# Patient Record
Sex: FEMALE | Race: WHITE | NOT HISPANIC OR LATINO | Employment: FULL TIME | URBAN - METROPOLITAN AREA
[De-identification: names, ages, dates, MRNs, and addresses within clinical notes are randomized per-mention and may not be internally consistent; named-entity substitution may affect disease eponyms.]

---

## 2017-01-06 ENCOUNTER — OFFICE VISIT (OUTPATIENT)
Dept: OBGYN | Facility: CLINIC | Age: 32
End: 2017-01-06
Payer: COMMERCIAL

## 2017-01-06 VITALS
DIASTOLIC BLOOD PRESSURE: 64 MMHG | BODY MASS INDEX: 26.96 KG/M2 | WEIGHT: 182 LBS | SYSTOLIC BLOOD PRESSURE: 118 MMHG | HEIGHT: 69 IN

## 2017-01-06 DIAGNOSIS — Z01.419 ENCOUNTER FOR GYNECOLOGICAL EXAMINATION WITHOUT ABNORMAL FINDING: Primary | ICD-10-CM

## 2017-01-06 DIAGNOSIS — N94.6 DYSMENORRHEA: ICD-10-CM

## 2017-01-06 DIAGNOSIS — Z78.9 USES CONTRACEPTION: ICD-10-CM

## 2017-01-06 DIAGNOSIS — Z13.6 ENCOUNTER FOR LIPID SCREENING FOR CARDIOVASCULAR DISEASE: ICD-10-CM

## 2017-01-06 DIAGNOSIS — Z13.228 SCREENING FOR METABOLIC DISORDER: ICD-10-CM

## 2017-01-06 DIAGNOSIS — Z13.0 SCREENING FOR DISORDER OF BLOOD AND BLOOD-FORMING ORGANS: ICD-10-CM

## 2017-01-06 DIAGNOSIS — Z13.220 ENCOUNTER FOR LIPID SCREENING FOR CARDIOVASCULAR DISEASE: ICD-10-CM

## 2017-01-06 LAB
ALBUMIN SERPL-MCNC: 3.9 G/DL (ref 3.4–5)
ALP SERPL-CCNC: 61 U/L (ref 40–150)
ALT SERPL W P-5'-P-CCNC: 33 U/L (ref 0–50)
ANION GAP SERPL CALCULATED.3IONS-SCNC: 9 MMOL/L (ref 3–14)
AST SERPL W P-5'-P-CCNC: 14 U/L (ref 0–45)
BILIRUB SERPL-MCNC: 0.4 MG/DL (ref 0.2–1.3)
BUN SERPL-MCNC: 14 MG/DL (ref 7–30)
CALCIUM SERPL-MCNC: 8.9 MG/DL (ref 8.5–10.1)
CHLORIDE SERPL-SCNC: 105 MMOL/L (ref 94–109)
CHOLEST SERPL-MCNC: 219 MG/DL
CO2 SERPL-SCNC: 25 MMOL/L (ref 20–32)
CREAT SERPL-MCNC: 0.73 MG/DL (ref 0.52–1.04)
ERYTHROCYTE [DISTWIDTH] IN BLOOD BY AUTOMATED COUNT: 11.9 % (ref 10–15)
GFR SERPL CREATININE-BSD FRML MDRD: NORMAL ML/MIN/1.7M2
GLUCOSE SERPL-MCNC: 82 MG/DL (ref 70–99)
HCT VFR BLD AUTO: 43.1 % (ref 35–47)
HDLC SERPL-MCNC: 101 MG/DL
HGB BLD-MCNC: 14.9 G/DL (ref 11.7–15.7)
LDLC SERPL CALC-MCNC: 104 MG/DL
MCH RBC QN AUTO: 31.4 PG (ref 26.5–33)
MCHC RBC AUTO-ENTMCNC: 34.6 G/DL (ref 31.5–36.5)
MCV RBC AUTO: 91 FL (ref 78–100)
NONHDLC SERPL-MCNC: 118 MG/DL
PLATELET # BLD AUTO: 225 10E9/L (ref 150–450)
POTASSIUM SERPL-SCNC: 3.7 MMOL/L (ref 3.4–5.3)
PROT SERPL-MCNC: 7 G/DL (ref 6.8–8.8)
RBC # BLD AUTO: 4.75 10E12/L (ref 3.8–5.2)
SODIUM SERPL-SCNC: 139 MMOL/L (ref 133–144)
TRIGL SERPL-MCNC: 72 MG/DL
WBC # BLD AUTO: 7.2 10E9/L (ref 4–11)

## 2017-01-06 PROCEDURE — 80053 COMPREHEN METABOLIC PANEL: CPT | Performed by: OBSTETRICS & GYNECOLOGY

## 2017-01-06 PROCEDURE — 99395 PREV VISIT EST AGE 18-39: CPT | Performed by: OBSTETRICS & GYNECOLOGY

## 2017-01-06 PROCEDURE — 36415 COLL VENOUS BLD VENIPUNCTURE: CPT | Performed by: OBSTETRICS & GYNECOLOGY

## 2017-01-06 PROCEDURE — 85027 COMPLETE CBC AUTOMATED: CPT | Performed by: OBSTETRICS & GYNECOLOGY

## 2017-01-06 PROCEDURE — 80061 LIPID PANEL: CPT | Performed by: OBSTETRICS & GYNECOLOGY

## 2017-01-06 ASSESSMENT — ANXIETY QUESTIONNAIRES
1. FEELING NERVOUS, ANXIOUS, OR ON EDGE: MORE THAN HALF THE DAYS
2. NOT BEING ABLE TO STOP OR CONTROL WORRYING: MORE THAN HALF THE DAYS
6. BECOMING EASILY ANNOYED OR IRRITABLE: MORE THAN HALF THE DAYS
7. FEELING AFRAID AS IF SOMETHING AWFUL MIGHT HAPPEN: MORE THAN HALF THE DAYS
IF YOU CHECKED OFF ANY PROBLEMS ON THIS QUESTIONNAIRE, HOW DIFFICULT HAVE THESE PROBLEMS MADE IT FOR YOU TO DO YOUR WORK, TAKE CARE OF THINGS AT HOME, OR GET ALONG WITH OTHER PEOPLE: VERY DIFFICULT
5. BEING SO RESTLESS THAT IT IS HARD TO SIT STILL: MORE THAN HALF THE DAYS
3. WORRYING TOO MUCH ABOUT DIFFERENT THINGS: MORE THAN HALF THE DAYS
GAD7 TOTAL SCORE: 15

## 2017-01-06 ASSESSMENT — PATIENT HEALTH QUESTIONNAIRE - PHQ9: 5. POOR APPETITE OR OVEREATING: NEARLY EVERY DAY

## 2017-01-06 NOTE — PROGRESS NOTES
Beth is a 31 year old No obstetric history on file. female who presents for annual exam.     Besides routine health maintenance, she has no other health concerns today .    HPI:  The patient's PCP is  Physician No Ref-Primary.    Has always had some anxiety, never dx'd o or treated.   No smoking.   Not exercising lately-has treadmil at home. Plans to try to get back into it this year.  Doing well on OCPs, likes the ortho cyclen. Would like refill. Works well for cramps.         GYNECOLOGIC HISTORY:    Patient's last menstrual period was 12/13/2016.  Her current contraception method is: oral contraceptives.  She  reports that she has never smoked. She has never used smokeless tobacco.    Patient is sexually active.  STD testing offered?  Declined  Last PHQ-9 score on record =   PHQ-9 SCORE 1/6/2017   Total Score 8     Last GAD7 score on record =   MC-7 SCORE 1/6/2017   Total Score 15     Alcohol Score = 4    HEALTH MAINTENANCE:  Cholesterol: none  Last Mammo: na, Result: not applicable, Next Mammo: na   Pap: 2004 ASCUS, 2005 neg, 2007 ASCUS/HPV neg, 2008 neg/neg, 2009 neg, 2012 neg/neg, 2014 neg/neg  Colonoscopy:  na, Result: nal, Next Colonoscopy: na years.  Dexa:  na    Health maintenance updated:  yes    HISTORY:  Obstetric History     No data available          There is no problem list on file for this patient.    Past Surgical History   Procedure Laterality Date     Colposcopu        Social History   Substance Use Topics     Smoking status: Never Smoker      Smokeless tobacco: Never Used     Alcohol Use: 0.0 oz/week     0 Standard drinks or equivalent per week           Current Outpatient Prescriptions   Medication Sig     ORTHO-CYCLEN, 28, 0.25-35 MG-MCG per tablet Take 1 tablet by mouth daily     [DISCONTINUED] ORTHO-CYCLEN, 28, 0.25-35 MG-MCG per tablet TAKE 1 TABLET BY MOUTH EVERY DAY     NO ACTIVE MEDICATIONS       No current facility-administered medications for this visit.     No Known  "Allergies    Past medical, surgical, social and family histories were reviewed and updated in EPIC.    ROS:   12 point review of systems negative other than symptoms noted below.    EXAM:  /64 mmHg  Ht 5' 8.5\" (1.74 m)  Wt 182 lb (82.555 kg)  BMI 27.27 kg/m2  LMP 12/13/2016  Breastfeeding? No   BMI: Body mass index is 27.27 kg/(m^2).    PHYSICAL EXAM:  Constitutional:  Appearance: Well nourished, well developed, alert, in no acute distress  Neck:  Lymph Nodes:  No lymphadenopathy present    Thyroid:  Gland size normal, nontender, no nodules or masses present  on palpation  Chest:  Respiratory Effort:  Breathing unlabored  Cardiovascular:    Heart: Auscultation:  Regular rate, normal rhythm, no murmurs present  Breasts: Inspection of Breasts:  No lymphadenopathy present    Palpation of Breasts and Axillae:  No masses present on palpation, no  breast tenderness    Axillary Lymph Nodes:  No lymphadenopathy present  Gastrointestinal:   Abdominal Examination:  Abdomen nontender to palpation, tone normal without rigidity or guarding, no masses present, umbilicus without lesions   Liver and Spleen:  No hepatomegaly present, liver nontender to palpation    Hernias:  No hernias present  Lymphatic: Lymph Nodes:  No other lymphadenopathy present  Skin:  General Inspection:  No rashes present, no lesions present, no areas of  discoloration    Genitalia and Groin:  No rashes present, no lesions present, no areas of  discoloration, no masses present  Neurologic/Psychiatric:    Mental Status:  Oriented X3     Pelvic Exam:  External Genitalia:     Normal appearance for age, no discharge present, no tenderness present, no inflammatory lesions present, color normal  Vagina:     Normal vaginal vault without central or paravaginal defects, no discharge present, no inflammatory lesions present, no masses present  Bladder:     Nontender to palpation  Urethra:   Urethral Body:  Urethra palpation normal, urethra structural " support normal   Urethral Meatus:  No erythema or lesions present  Cervix:     Appearance healthy, no lesions present, nontender to palpation, no bleeding present  Uterus:     Nontender to palpation, no masses present, position anteflexed, mobility: normal  Adnexa:     No adnexal tenderness present, no adnexal masses present  Perineum:     Perineum within normal limits, no evidence of trauma, no rashes or skin lesions present  Anus:     Anus within normal limits, no hemorrhoids present  Inguinal Lymph Nodes:     No lymphadenopathy present  Pubic Hair:     Normal pubic hair distribution for age  Genitalia and Groin:     No rashes present, no lesions present, no areas of discoloration, no masses present    COUNSELING:   Reviewed preventive health counseling, as reflected in patient instructions       Regular exercise       Healthy diet/nutrition       Contraception    BMI: Body mass index is 27.27 kg/(m^2).  Weight management plan: Discussed healthy diet and exercise guidelines and patient will follow up in 12 months in clinic to re-evaluate.    ASSESSMENT:  31 year old female with satisfactory annual exam.    ICD-10-CM    1. Encounter for gynecological examination without abnormal finding [Z01.419] Z01.419    2. Uses contraception Z30.9 ORTHO-CYCLEN, 28, 0.25-35 MG-MCG per tablet   3. Encounter for lipid screening for cardiovascular disease Z13.220 Lipid Profile (Chol, Trig, HDL, LDL calc)    Z13.6    4. Screening for disorder of blood and blood-forming organs Z13.0 CBC with platelets   5. Screening for metabolic disorder Z13.228 Comprehensive metabolic panel   6. Dysmenorrhea N94.6        PLAN:  -UTD (2014) for cervical cancer screening. Reviewed guidelines.  -Breast self awareness discussed.   -Discussed exercise-making plan, strength training. Nutrition encouraged.  -Fasting labs reviewed  -Refill OCPs, usses for contraception and dysmenorrhea. Happy with name brand, will refill.   Reviwed age related fertility  decline, aneuploidy risk with increasing age as pt wondering when she should get pregnant. Ultimately, needs to have her health and life settled and feel overall ready-should not feel pressured based on age alone.  -Return one year for next annual exam      Gisele Velas, DO

## 2017-01-06 NOTE — MR AVS SNAPSHOT
"              After Visit Summary   1/6/2017    Beth Bradley    MRN: [id number]           Patient Information     Date Of Birth          1985        Visit Information        Provider Department      1/6/2017 8:30 AM Gisele Corea DO [facility]        Today's Diagnoses     Encounter for gynecological examination without abnormal finding [Z01.419]    -  1     Uses contraception         Encounter for lipid screening for cardiovascular disease         Screening for disorder of blood and blood-forming organs         Screening for metabolic disorder         Dysmenorrhea            Follow-ups after your visit        Follow-up notes from your care team     Return in about 1 year (around 1/6/2018).      Who to contact     If you have questions or need follow up information about today's clinic visit or your schedule please contact [facility] directly at [phone].  Normal or non-critical lab and imaging results will be communicated to you by [employer], letter or phone within 4 business days after the clinic has received the results. If you do not hear from us within 7 days, please contact the clinic through [employer] or phone. If you have a critical or abnormal lab result, we will notify you by phone as soon as possible.  Submit refill requests through MiTio or call your pharmacy and they will forward the refill request to us. Please allow 3 business days for your refill to be completed.          Additional Information About Your Visit        MyChart Information     MiTio lets you send messages to your doctor, view your test results, renew your prescriptions, schedule appointments and more. To sign up, go to [email or web address] . Click on \"Log in\" on the left side of the screen, which will take you to the Welcome page. Then click on \"Sign up Now\" on the right side of the page.     You will be asked to enter the access code listed below, as well as some " "personal information. Please follow the directions to create your username and password.     Your access code is: [email or web address]  Expires: 2017 10:45 AM     Your access code will  in 90 days. If you need help or a new code, please call your [place] clinic or [phone].        Care EveryWhere ID     This is your Care EveryWhere ID. This could be used by other organizations to access your [place] medical records  [id number]        Your Vitals Were     Height BMI (Body Mass Index) Last Period Breastfeeding?          5' 8.5\" (1.74 m) 27.27 kg/m2 2016 No         Blood Pressure from Last 3 Encounters:   17 118/64   12 105/68    Weight from Last 3 Encounters:   17 182 lb (82.555 kg)              We Performed the Following     CBC with platelets     Comprehensive metabolic panel     Lipid Profile (Chol, Trig, HDL, LDL calc)          Today's Medication Changes          These changes are accurate as of: 17 10:45 AM.  If you have any questions, ask your nurse or doctor.               These medicines have changed or have updated prescriptions.        Dose/Directions    ORTHO-CYCLEN (28) 0.25-35 MG-MCG per tablet   This may have changed:  See the new instructions.   Used for:  Uses contraception   Generic drug:  norgestimate-ethinyl estradiol   Changed by:  Gisele Corea,         Dose:  1 tablet   Take 1 tablet by mouth daily   Quantity:  84 tablet   Refills:  4         Stop taking these medicines if you haven't already. Please contact your care team if you have questions.     ondansetron 4 MG tablet   Commonly known as:  ZOFRAN   Stopped by:  Gisele Corea, DO                Where to get your medicines      These medications were sent to [facility]pharm[facility][id number] [facility] [address] [facility]  [address] [facility] [address]     Phone:  [phone]    - ORTHO-CYCLEN (28) 0.25-35 MG-MCG per tablet             Primary Care Provider    Physician No " Ref-Primary       No address on file        Thank you!     Thank you for choosing [facility] [place]  for your care. Our goal is always to provide you with excellent care. Hearing back from our patients is one way we can continue to improve our services. Please take a few minutes to complete the written survey that you may receive in the mail after your visit with us. Thank you!             Your Updated Medication List - Protect others around you: Learn how to safely use, store and throw away your medicines at www.disposemymeds.org.          This list is accurate as of: 1/6/17 10:45 AM.  Always use your most recent med list.                   Brand Name Dispense Instructions for use    NO ACTIVE MEDICATIONS          ORTHO-CYCLEN (28) 0.25-35 MG-MCG per tablet   Generic drug:  norgestimate-ethinyl estradiol     84 tablet    Take 1 tablet by mouth daily

## 2017-01-07 ASSESSMENT — ANXIETY QUESTIONNAIRES: GAD7 TOTAL SCORE: 15

## 2017-01-07 ASSESSMENT — PATIENT HEALTH QUESTIONNAIRE - PHQ9: SUM OF ALL RESPONSES TO PHQ QUESTIONS 1-9: 8

## 2017-01-13 NOTE — PROGRESS NOTES
Quick Note:    Cholesterol overall is pretty good, the good cholesterol (HDL) is high. Continue to make changes toward increasing aerobic and wt bearing exercises. Please call pt to notify.     Gisele Corea, DO    ______

## 2017-09-26 ENCOUNTER — TELEPHONE (OUTPATIENT)
Dept: NURSING | Facility: CLINIC | Age: 32
End: 2017-09-26

## 2017-09-26 DIAGNOSIS — F41.9 ANXIETY: Primary | ICD-10-CM

## 2017-09-26 DIAGNOSIS — F32.A DEPRESSION, UNSPECIFIED DEPRESSION TYPE: ICD-10-CM

## 2017-09-26 ASSESSMENT — ANXIETY QUESTIONNAIRES
2. NOT BEING ABLE TO STOP OR CONTROL WORRYING: NEARLY EVERY DAY
7. FEELING AFRAID AS IF SOMETHING AWFUL MIGHT HAPPEN: NEARLY EVERY DAY
6. BECOMING EASILY ANNOYED OR IRRITABLE: MORE THAN HALF THE DAYS
IF YOU CHECKED OFF ANY PROBLEMS ON THIS QUESTIONNAIRE, HOW DIFFICULT HAVE THESE PROBLEMS MADE IT FOR YOU TO DO YOUR WORK, TAKE CARE OF THINGS AT HOME, OR GET ALONG WITH OTHER PEOPLE: VERY DIFFICULT
5. BEING SO RESTLESS THAT IT IS HARD TO SIT STILL: MORE THAN HALF THE DAYS
1. FEELING NERVOUS, ANXIOUS, OR ON EDGE: NEARLY EVERY DAY
3. WORRYING TOO MUCH ABOUT DIFFERENT THINGS: NEARLY EVERY DAY
GAD7 TOTAL SCORE: 19

## 2017-09-26 ASSESSMENT — PATIENT HEALTH QUESTIONNAIRE - PHQ9
5. POOR APPETITE OR OVEREATING: NEARLY EVERY DAY
SUM OF ALL RESPONSES TO PHQ QUESTIONS 1-9: 19

## 2017-09-26 NOTE — TELEPHONE ENCOUNTER
"Pt calling, last time was in clinic for annual (1/6/17) states her and Dr. Corea discussed mental health professionals and pt is wondering if there was any mention of specific providers of who Dr. Corea would refer her to/wondering if she can get a referral as this is now impacting her work and relationships. She doesn't have a primary provider, the provider she sees is Dr. Corea. Informed she can check with her insurance to see who is covered, can contact clinics to see if they take her insurance, may not need referral to be seen but at some places may not. Indicated typically our providers indicate to be seen whoever is first available as sometimes can take awhile to get in with Mental Health.    Asked pt PHQ-9 & MC-7 over the telephone. PHQ-9: result of 19 (Extremely Difficult) and MC-7: result of 19 (Very Difficult). Pt noted on the question \"Thoughts that you would be better off dead, or of hurting yourself in some way\" several days over the last 2 wks. States that has thoughts about death but does NOT have a plan or thoughts to go through with something and harm herself. Reviewed with pt twice while on the phone if she were to have thoughts of harming herself or a plan to go into the nearest ER and call a crisis line, pt verbalized understanding.    Recommended having a PCP, but recommend based off her scores to be seen this wk by Dr. Corea or a PCP. Pt verbalized understanding, scheduled appt with Dr. Corea for Thurs. 9/28/17 at 8:40 AM for long appt, advised if anything changes in between then to let us know. Pt verbalized understanding.      Routing to Dr. Corea to review/advise. Can Triage place a mental health referral so that process can get started?        "

## 2017-09-27 ASSESSMENT — ANXIETY QUESTIONNAIRES: GAD7 TOTAL SCORE: 19

## 2017-09-27 NOTE — TELEPHONE ENCOUNTER
"Attempted to contact the pt. The msg was \"mailbox\" is full.  Will call back later.    Was able to make contact with pt at 0830- Gave the pt the phone number for [facility] intake to call to make an appt. Pt voiced understanding.  "

## 2017-09-27 NOTE — TELEPHONE ENCOUNTER
Yes, referral placed.  I am always ok with triage placing referral without delay.    Gisele De Leon Masters, DO

## 2017-10-09 ENCOUNTER — TELEPHONE (OUTPATIENT)
Dept: OBGYN | Facility: CLINIC | Age: 32
End: 2017-10-09

## 2017-10-09 ENCOUNTER — OFFICE VISIT (OUTPATIENT)
Dept: OBGYN | Facility: CLINIC | Age: 32
End: 2017-10-09
Payer: COMMERCIAL

## 2017-10-09 VITALS
SYSTOLIC BLOOD PRESSURE: 130 MMHG | HEIGHT: 69 IN | BODY MASS INDEX: 25.33 KG/M2 | DIASTOLIC BLOOD PRESSURE: 80 MMHG | WEIGHT: 171 LBS

## 2017-10-09 DIAGNOSIS — N89.8 VAGINAL DISCHARGE: ICD-10-CM

## 2017-10-09 DIAGNOSIS — N76.0 BV (BACTERIAL VAGINOSIS): ICD-10-CM

## 2017-10-09 DIAGNOSIS — F41.9 ANXIETY: ICD-10-CM

## 2017-10-09 DIAGNOSIS — Z11.8 SCREENING FOR CHLAMYDIAL DISEASE: ICD-10-CM

## 2017-10-09 DIAGNOSIS — B96.89 BV (BACTERIAL VAGINOSIS): ICD-10-CM

## 2017-10-09 DIAGNOSIS — Z11.3 SCREEN FOR STD (SEXUALLY TRANSMITTED DISEASE): Primary | ICD-10-CM

## 2017-10-09 LAB
SPECIMEN SOURCE: ABNORMAL
WET PREP SPEC: ABNORMAL

## 2017-10-09 PROCEDURE — 87210 SMEAR WET MOUNT SALINE/INK: CPT | Performed by: OBSTETRICS & GYNECOLOGY

## 2017-10-09 PROCEDURE — 36415 COLL VENOUS BLD VENIPUNCTURE: CPT | Performed by: OBSTETRICS & GYNECOLOGY

## 2017-10-09 PROCEDURE — 87491 CHLMYD TRACH DNA AMP PROBE: CPT | Performed by: OBSTETRICS & GYNECOLOGY

## 2017-10-09 PROCEDURE — 99214 OFFICE O/P EST MOD 30 MIN: CPT | Performed by: OBSTETRICS & GYNECOLOGY

## 2017-10-09 PROCEDURE — 87591 N.GONORRHOEAE DNA AMP PROB: CPT | Performed by: OBSTETRICS & GYNECOLOGY

## 2017-10-09 PROCEDURE — 86780 TREPONEMA PALLIDUM: CPT | Performed by: OBSTETRICS & GYNECOLOGY

## 2017-10-09 PROCEDURE — 87389 HIV-1 AG W/HIV-1&-2 AB AG IA: CPT | Performed by: OBSTETRICS & GYNECOLOGY

## 2017-10-09 NOTE — MR AVS SNAPSHOT
After Visit Summary   10/9/2017    Beth Bradley    MRN: [id number]           Patient Information     Date Of Birth          1985        Visit Information        Provider Department      10/9/2017 11:20 AM Gisele Corea DO [facility]        Today's Diagnoses     Screen for STD (sexually transmitted disease)    -  1    Screening for chlamydial disease        Anxiety        Vaginal discharge        BV (bacterial vaginosis)           Follow-ups after your visit        Additional Services     MENTAL HEALTH REFERRAL       Your provider has referred you to: Behavioral Healthcare Providers Intake Scheduling [phone]  [email or web address]    PSYCHIATRY referral    All scheduling is subject to the client's specific insurance plan & benefits, provider/location availability, and provider clinical specialities.  Please arrive 15 minutes early for your first appointment and bring your completed paperwork.    Please be aware that coverage of these services is subject to the terms and limitations of your health insurance plan.  Call member services at your health plan with any benefit or coverage questions.                  Follow-up notes from your care team     Return if symptoms worsen or fail to improve.      Who to contact     If you have questions or need follow up information about today's clinic visit or your schedule please contact [facility] directly at [phone].  Normal or non-critical lab and imaging results will be communicated to you by MyChart, letter or phone within 4 business days after the clinic has received the results. If you do not hear from us within 7 days, please contact the clinic through MyChart or phone. If you have a critical or abnormal lab result, we will notify you by phone as soon as possible.  Submit refill requests through [employer] or call your pharmacy and they will forward the refill request to us. Please  "allow 3 business days for your refill to be completed.          Additional Information About Your Visit        MyChart Information     [employer] lets you send messages to your doctor, view your test results, renew your prescriptions, schedule appointments and more. To sign up, go to [email or web address] . Click on \"Log in\" on the left side of the screen, which will take you to the Welcome page. Then click on \"Sign up Now\" on the right side of the page.     You will be asked to enter the access code listed below, as well as some personal information. Please follow the directions to create your username and password.     Your access code is: [email or web address]  Expires: 2017  9:46 AM     Your access code will  in 90 days. If you need help or a new code, please call your [place] clinic or [phone].        Care EveryWhere ID     This is your Care EveryWhere ID. This could be used by other organizations to access your [place] medical records  [id number]        Your Vitals Were     Height Last Period BMI (Body Mass Index)             5' 8.5\" (1.74 m) 2017 25.62 kg/m2          Blood Pressure from Last 3 Encounters:   10/09/17 130/80   17 112/76   17 118/64    Weight from Last 3 Encounters:   10/09/17 171 lb (77.6 kg)   17 172 lb (78 kg)   17 182 lb (82.6 kg)              We Performed the Following     Anti Treponema     CHLAMYDIA TRACHOMATIS PCR     HIV Antigen Antibody Combo     MENTAL HEALTH REFERRAL     NEISSERIA GONORRHOEA PCR     Wet prep          Today's Medication Changes          These changes are accurate as of: 10/9/17 11:59 PM.  If you have any questions, ask your nurse or doctor.               Start taking these medicines.        Dose/Directions    metroNIDAZOLE 500 MG tablet   Commonly known as:  FLAGYL   Used for:  BV (bacterial vaginosis), Vaginal discharge   Started by:  Gisele Corea, DO        Dose:  500 mg   Take 1 tablet (500 mg) by mouth 2 times " daily   Quantity:  14 tablet   Refills:  0            Where to get your medicines      These medications were sent to [facility]/pharmacy #[id number] - [facility] [address] [facility]  [address], [facility] [address]     Phone:  [phone]     metroNIDAZOLE 500 MG tablet                Primary Care Provider    Physician No Ref-Primary       NO REF-PRIMARY PHYSICIAN        Equal Access to Services     GLADYS BURNHAM : Hadii phong ku hadasho Soomaali, waaxda luqadaha, qaybta kaalmada adeeloyada, porter carlislegenesismatt potts . So [facility] [phone].    ATENCIÓN: Si habla español, tiene a conde disposición servicios gratuitos de asistencia lingüística. Carloz al [phone].    We comply with applicable federal civil rights laws and [place] laws. We do not discriminate on the basis of race, color, national origin, age, disability, sex, sexual orientation, or gender identity.            Thank you!     Thank you for choosing [facility] [place]  for your care. Our goal is always to provide you with excellent care. Hearing back from our patients is one way we can continue to improve our services. Please take a few minutes to complete the written survey that you may receive in the mail after your visit with us. Thank you!             Your Updated Medication List - Protect others around you: Learn how to safely use, store and throw away your medicines at www.disposemymeds.org.          This list is accurate as of: 10/9/17 11:59 PM.  Always use your most recent med list.                   Brand Name Dispense Instructions for use Diagnosis    metroNIDAZOLE 500 MG tablet    FLAGYL    14 tablet    Take 1 tablet (500 mg) by mouth 2 times daily    BV (bacterial vaginosis), Vaginal discharge       ORTHO-CYCLEN (28) 0.25-35 MG-MCG per tablet   Generic drug:  norgestimate-ethinyl estradiol     84 tablet    Take 1 tablet by mouth daily    Uses contraception

## 2017-10-09 NOTE — PROGRESS NOTES
SUBJECTIVE:                                                   Beth Bradley is a 32 year old female who presents to clinic today for the following health issue(s):  Patient presents with:  STD      Additional information: desires piece of mind    HPI:  Broke up with BF in March after 2.5yrs. Then had new partner once and now back with BF. Wants piece of mind. Sometimes more discharge than others.   Gets waxed, has ingrown hairs from time to time.     Has been a little more up and down lately. Wondering about seeing psychiatrist.   Notes has PMS-about 4 days of month.    Review of PMH, SocHx, SurHx, FHx, medications completed. Epic updated.       Patient's last menstrual period was 09/19/2017..   Patient is sexually active, No obstetric history on file..  Using oral contraceptives for contraception.    reports that she has never smoked. She has never used smokeless tobacco.      STD testing offered?  Accepted    Health maintenance updated:  yes    Today's PHQ-2 Score: No flowsheet data found.  Today's PHQ-9 Score:   PHQ-9 SCORE 9/28/2017   Total Score 16     Today's MC-7 Score:   MC-7 SCORE 9/28/2017   Total Score 17       Problem list and histories reviewed & adjusted, as indicated.  Additional history: as documented.    There is no problem list on file for this patient.    Past Surgical History:   Procedure Laterality Date     COLPOSCOPY CERVIX, BIOPSY CERVIX, ENDOCERVICAL CURETTAGE, COMBINED        Social History   Substance Use Topics     Smoking status: Never Smoker     Smokeless tobacco: Never Used     Alcohol use 0.0 oz/week     0 Standard drinks or equivalent per week           Current Outpatient Prescriptions   Medication Sig     metroNIDAZOLE (FLAGYL) 500 MG tablet Take 1 tablet (500 mg) by mouth 2 times daily     ORTHO-CYCLEN, 28, 0.25-35 MG-MCG per tablet Take 1 tablet by mouth daily     No current facility-administered medications for this visit.      No Known Allergies    ROS:  12 point review  "of systems negative other than symptoms noted below.    OBJECTIVE:     /80  Ht 5' 8.5\" (1.74 m)  Wt 171 lb (77.6 kg)  LMP 09/19/2017  BMI 25.62 kg/m2  Body mass index is 25.62 kg/(m^2).    Exam:  Constitutional:  Appearance: Well nourished, well developed alert, in no acute distress  Chest:  Respiratory Effort:  Breathing unlabored  Cardiovascular:no edema  Lymphatic: Lymph Nodes:  No other lymphadenopathy present  Skin:General Inspection:  No rashes present, no lesions present, no areas of discoloration; Genitalia and Groin:  No rashes present, no lesions present, no areas of discoloration, no masses present.  Neurologic/Psychiatric:  Mental Status:  Oriented X3   Pelvic Exam:  External Genitalia:     Normal appearance for age, no discharge present, no tenderness present, no inflammatory lesions present, color normal, small resolving ingrown hair, no signs infection  Vagina:     Normal vaginal vault without central or paravaginal defects, + white thin discharge present, no inflammatory lesions present, no masses present  Bladder:     Nontender to palpation  Urethra:   Urethral Body:  Urethra palpation normal, urethra structural support normal   Urethral Meatus:  No erythema or lesions present  Cervix:     Appearance healthy, no lesions present, nontender to palpation, no bleeding present  Perineum:     Perineum within normal limits, no evidence of trauma, no rashes or skin lesions present  Anus:     Anus within normal limits, no hemorrhoids present  Inguinal Lymph Nodes:     No lymphadenopathy present  Pubic Hair:     Normal pubic hair distribution for age  Genitalia and Groin:     No rashes present, no lesions present, no areas of discoloration, no masses present         ASSESSMENT/PLAN:                                                        ICD-10-CM    1. Screen for STD (sexually transmitted disease) Z11.3 NEISSERIA GONORRHOEA PCR     HIV Antigen Antibody Combo     Anti Treponema     Wet prep   2. " Screening for chlamydial disease Z11.8 CHLAMYDIA TRACHOMATIS PCR   3. Anxiety F41.9 MENTAL HEALTH REFERRAL   4. Vaginal discharge N89.8 Wet prep     metroNIDAZOLE (FLAGYL) 500 MG tablet   5. BV (bacterial vaginosis) N76.0 metroNIDAZOLE (FLAGYL) 500 MG tablet    B96.89          -Pt admittedly with some anxiety in general about health conditions, here for STI screening without specific concerns or symptoms. Reviewed basic screening and recs for future. Wet prep for discharge and trich screen. Will call with results.   -Small resolving ingrown hair. DIscussed alternatives to waxing and what to do if gets in grown hairs.   -Referral for psychiatry placed. Is interested in discussing all options for anxiety control and have nutritional testing.  No SI/HI, no red flags  Can consider luteal phase SSRI pending psych eval and tx plan.    Gisele Velas, DO  [facility]      ADDENDUM:  Wet prep pos for BV, rx for flagyl and pt will be called with results and rx info.  Gisele Velas, DO

## 2017-10-10 LAB
HIV 1+2 AB+HIV1 P24 AG SERPL QL IA: NONREACTIVE
T PALLIDUM IGG+IGM SER QL: NEGATIVE

## 2017-10-10 NOTE — TELEPHONE ENCOUNTER
STD screening 10/9/17. Pt stated speculum was dropped into sink and then used. Pt wanted to know how often the sinks are siped down. Informed pt that all surfaces are wiped down after each pt with out wipes. Pt had not further questions.

## 2017-10-11 LAB
C TRACH DNA SPEC QL NAA+PROBE: NEGATIVE
N GONORRHOEA DNA SPEC QL NAA+PROBE: NEGATIVE
SPECIMEN SOURCE: NORMAL
SPECIMEN SOURCE: NORMAL

## 2017-10-11 RX ORDER — METRONIDAZOLE 500 MG/1
500 TABLET ORAL 2 TIMES DAILY
Qty: 14 TABLET | Refills: 0 | Status: SHIPPED | OUTPATIENT
Start: 2017-10-11 | End: 2018-02-19

## 2017-10-12 ENCOUNTER — TELEPHONE (OUTPATIENT)
Dept: NURSING | Facility: CLINIC | Age: 32
End: 2017-10-12

## 2017-10-12 NOTE — TELEPHONE ENCOUNTER
Pt returned call. Informed of results and message below. No further questions. Pt understands not to use alcohol with medication.     Notes Recorded by Anmol, Gisele De Leon DO on 10/11/2017 at 9:05 PM  Labs are normal. Exception was changes on wet prep showing bacterial vaginosis, which is not an STD but rather an imbalance of bacteria of the vagina and easily treated with antibiotics. Medications have been sent to her pharmacy. She should take these for 7 days, do not mix with alcohol. Please call pt to notify.

## 2017-10-12 NOTE — PROGRESS NOTES
Labs are normal. Exception was changes on wet prep showing bacterial vaginosis, which is not an STD but rather an imbalance of bacteria of the vagina and easily treated with antibiotics. Medications have been sent to her pharmacy. She should take these for 7 days, do not mix with alcohol. Please call pt to notify.     Gisele Velas, DO

## 2017-10-13 NOTE — TELEPHONE ENCOUNTER
Pt called back stated she wanted to know if she could wait until Monday to start her Rx as she is planning to have some alcohol this weekend. Informed pt if she waits her symptoms could worsen but up to her when she starts although recommended to start to avoid worsening symptoms. Pt stated understanding.

## 2017-11-10 ENCOUNTER — TELEPHONE (OUTPATIENT)
Dept: OBGYN | Facility: CLINIC | Age: 32
End: 2017-11-10

## 2017-11-10 NOTE — TELEPHONE ENCOUNTER
Needs Dr Vela to reach out to talk to [facility] to explain why she needs to BC she is on. Currently getting a $105 penalty per order because this is not documented.     Nurse please call Beth first to explain full details.

## 2017-11-10 NOTE — TELEPHONE ENCOUNTER
Pt currently doing enrollment on health benefits. Pt will only use orthocyclen due to side effects of others, states Dr. Corea is aware of this. Pt states she needs a PA. Informed pt to have her pharmacy fax over PA paperwork so it can be initiated.  Awaiting forms. Routing to Lu Feldman- pt needs PA for her OCP so it will be covered.

## 2018-01-16 ENCOUNTER — TRANSFERRED RECORDS (OUTPATIENT)
Dept: HEALTH INFORMATION MANAGEMENT | Facility: CLINIC | Age: 33
End: 2018-01-16

## 2018-02-19 ENCOUNTER — OFFICE VISIT (OUTPATIENT)
Dept: OBGYN | Facility: CLINIC | Age: 33
End: 2018-02-19
Payer: COMMERCIAL

## 2018-02-19 VITALS
BODY MASS INDEX: 24.17 KG/M2 | HEIGHT: 69 IN | DIASTOLIC BLOOD PRESSURE: 70 MMHG | HEART RATE: 66 BPM | SYSTOLIC BLOOD PRESSURE: 102 MMHG | WEIGHT: 163.2 LBS

## 2018-02-19 DIAGNOSIS — Z13.6 ENCOUNTER FOR LIPID SCREENING FOR CARDIOVASCULAR DISEASE: ICD-10-CM

## 2018-02-19 DIAGNOSIS — Z78.9 USES CONTRACEPTION: ICD-10-CM

## 2018-02-19 DIAGNOSIS — Z01.419 ENCOUNTER FOR GYNECOLOGICAL EXAMINATION WITHOUT ABNORMAL FINDING: Primary | ICD-10-CM

## 2018-02-19 DIAGNOSIS — Z13.220 ENCOUNTER FOR LIPID SCREENING FOR CARDIOVASCULAR DISEASE: ICD-10-CM

## 2018-02-19 LAB
CHOLEST SERPL-MCNC: 172 MG/DL
HDLC SERPL-MCNC: 83 MG/DL
LDLC SERPL CALC-MCNC: 75 MG/DL
NONHDLC SERPL-MCNC: 89 MG/DL
TRIGL SERPL-MCNC: 72 MG/DL

## 2018-02-19 PROCEDURE — 80061 LIPID PANEL: CPT | Performed by: OBSTETRICS & GYNECOLOGY

## 2018-02-19 PROCEDURE — 99395 PREV VISIT EST AGE 18-39: CPT | Performed by: OBSTETRICS & GYNECOLOGY

## 2018-02-19 PROCEDURE — 36415 COLL VENOUS BLD VENIPUNCTURE: CPT | Performed by: OBSTETRICS & GYNECOLOGY

## 2018-02-19 ASSESSMENT — ANXIETY QUESTIONNAIRES
6. BECOMING EASILY ANNOYED OR IRRITABLE: SEVERAL DAYS
7. FEELING AFRAID AS IF SOMETHING AWFUL MIGHT HAPPEN: SEVERAL DAYS
2. NOT BEING ABLE TO STOP OR CONTROL WORRYING: MORE THAN HALF THE DAYS
IF YOU CHECKED OFF ANY PROBLEMS ON THIS QUESTIONNAIRE, HOW DIFFICULT HAVE THESE PROBLEMS MADE IT FOR YOU TO DO YOUR WORK, TAKE CARE OF THINGS AT HOME, OR GET ALONG WITH OTHER PEOPLE: SOMEWHAT DIFFICULT
GAD7 TOTAL SCORE: 11
1. FEELING NERVOUS, ANXIOUS, OR ON EDGE: MORE THAN HALF THE DAYS
5. BEING SO RESTLESS THAT IT IS HARD TO SIT STILL: SEVERAL DAYS
3. WORRYING TOO MUCH ABOUT DIFFERENT THINGS: MORE THAN HALF THE DAYS

## 2018-02-19 ASSESSMENT — PATIENT HEALTH QUESTIONNAIRE - PHQ9: 5. POOR APPETITE OR OVEREATING: MORE THAN HALF THE DAYS

## 2018-02-19 NOTE — MR AVS SNAPSHOT
"              After Visit Summary   2/19/2018    Beth Bradley    MRN: [id number]           Patient Information     Date Of Birth          1985        Visit Information        Provider Department      2/19/2018 8:30 AM Gisele Corea DO [facility]        Today's Diagnoses     Encounter for gynecological examination without abnormal finding    -  1    Uses contraception        Encounter for lipid screening for cardiovascular disease           Follow-ups after your visit        Follow-up notes from your care team     Return in about 1 year (around 2/19/2019).      Who to contact     If you have questions or need follow up information about today's clinic visit or your schedule please contact [facility] directly at [phone].  Normal or non-critical lab and imaging results will be communicated to you by MyChart, letter or phone within 4 business days after the clinic has received the results. If you do not hear from us within 7 days, please contact the clinic through [employer] or phone. If you have a critical or abnormal lab result, we will notify you by phone as soon as possible.  Submit refill requests through [email or web address] or call your pharmacy and they will forward the refill request to us. Please allow 3 business days for your refill to be completed.          Additional Information About Your Visit        MyChart Information     [email or web address] lets you send messages to your doctor, view your test results, renew your prescriptions, schedule appointments and more. To sign up, go to [email or web address] . Click on \"Log in\" on the left side of the screen, which will take you to the Welcome page. Then click on \"Sign up Now\" on the right side of the page.     You will be asked to enter the access code listed below, as well as some personal information. Please follow the directions to create your username and password.     Your access code is: [email or web address]  Expires: " "2018  9:05 AM     Your access code will  in 90 days. If you need help or a new code, please call your [place] clinic or [phone].        Care EveryWhere ID     This is your Care EveryWhere ID. This could be used by other organizations to access your [place] medical records  [id number]        Your Vitals Were     Pulse Height Last Period BMI (Body Mass Index)          66 5' 8.5\" (1.74 m) 2018 (Exact Date) 24.45 kg/m2         Blood Pressure from Last 3 Encounters:   18 102/70   10/09/17 130/80   17 112/76    Weight from Last 3 Encounters:   18 163 lb 3.2 oz (74 kg)   10/09/17 171 lb (77.6 kg)   17 172 lb (78 kg)              We Performed the Following     Lipid panel reflex to direct LDL Fasting          Where to get your medicines      These medications were sent to [facility]/pharmacy #[id number] - [facility] [address] [facility]  [address] [facility] [address]     Phone:  [phone]     ORTHO-CYCLEN (28) 0.25-35 MG-MCG per tablet          Primary Care Provider Fax #    Physician No Ref-Primary [phone]       No address on file        Equal Access to Services     GLADYS BURNHAM : Lyn garciao Somarimarali, waaxda luqadaha, qaybta kaalmada adeegyada, porter chand. So [facility] [phone].    ATENCIÓN: Si habla español, tiene a conde disposición servicios gratuitos de asistencia lingüística. Llame al [phone].    We comply with applicable federal civil rights laws and [place] laws. We do not discriminate on the basis of race, color, national origin, age, disability, sex, sexual orientation, or gender identity.            Thank you!     Thank you for choosing [facility]  for your care. Our goal is always to provide you with excellent care. Hearing back from our patients is one way we can continue to improve our services. Please take a few minutes to complete the written survey that you may receive in the mail " after your visit with us. Thank you!             Your Updated Medication List - Protect others around you: Learn how to safely use, store and throw away your medicines at www.disposemymeds.org.          This list is accurate as of 2/19/18  9:05 AM.  Always use your most recent med list.                   Brand Name Dispense Instructions for use Diagnosis    ORTHO-CYCLEN (28) 0.25-35 MG-MCG per tablet   Generic drug:  norgestimate-ethinyl estradiol     84 tablet    Take 1 tablet by mouth daily    Uses contraception

## 2018-02-19 NOTE — PROGRESS NOTES
Beth is a 32 year old No obstetric history on file. female who presents for annual exam.     Besides routine health maintenance, she would like to discuss previous vaginal infection. Was treated here for BV and had vaginal itching afterwards-had an online visit and was given treatment for yeast. No longer has symptoms but still has concerns of BV.    HPI:  No primary care provider on file.      Took a 3wk unpaid leave from work recently. Not too happy with her job. Looking for new job.     After last visit, never went to the psychiatrist. Took the stated leave. This seemed to help. Feels she is managing better.      Happy with OCPs. Would like refill. Wants to have rx for brand only of OCPs, does best with these. Feels like she has not had any issues on this and doesn't want to change anything.     One of last visits had BV, took the tx. Then felt like she got a yeast infection afterward. Did the virtual provider, had discharge and itching. Took two treatments and hasn't had any issues since then.     Trying to exercise more, jogs 2-3x/wk. No supplements.     Still considering having kids.     GYNECOLOGIC HISTORY:    Patient's last menstrual period was 02/06/2018 (exact date).  Her current contraception method is: oral contraceptives.  She  reports that she has never smoked. She has never used smokeless tobacco.    Patient is not sexually active.  STD testing offered?  Declined  Last PHQ-9 score on record =   PHQ-9 SCORE 2/19/2018   Total Score 6     Last GAD7 score on record =   MC-7 SCORE 2/19/2018   Total Score 11     Alcohol Score = 8    HEALTH MAINTENANCE:  Cholesterol: 1/6/17   Total= 219, Triglycerides=72, GSM=626, CQI=068, FBS=82 -patient is fasting  Last Mammo: NA, not due until age 40  Pap: 7/15/14 neg, HPV-  2004 ASCUS, 2005 neg, 2007 ASCUS/HPV neg, 2008 neg/neg, 2009 neg, 2012 neg/neg, 2014 neg/neg.  Colonoscopy: NA, not due until age 50  Dexa: Never    Health maintenance updated:   "yes    HISTORY:  Obstetric History     No data available          There is no problem list on file for this patient.    Past Surgical History:   Procedure Laterality Date     COLPOSCOPY CERVIX, BIOPSY CERVIX, ENDOCERVICAL CURETTAGE, COMBINED        Social History   Substance Use Topics     Smoking status: Never Smoker     Smokeless tobacco: Never Used     Alcohol use 0.0 oz/week     0 Standard drinks or equivalent per week           Current Outpatient Prescriptions   Medication Sig     ORTHO-CYCLEN, 28, 0.25-35 MG-MCG per tablet Take 1 tablet by mouth daily     [DISCONTINUED] ORTHO-CYCLEN, 28, 0.25-35 MG-MCG per tablet Take 1 tablet by mouth daily     No current facility-administered medications for this visit.      No Known Allergies    Past medical, surgical, social and family histories were reviewed and updated in EPIC.    ROS:   12 point review of systems negative other than symptoms noted below.  Head: Ringing  Genitourinary: Significant PMS and Vaginal Itching  Psychiatric: Anxiety and Difficulty Sleeping    EXAM:  /70  Pulse 66  Ht 5' 8.5\" (1.74 m)  Wt 163 lb 3.2 oz (74 kg)  LMP 02/06/2018 (Exact Date)  BMI 24.45 kg/m2   BMI: Body mass index is 24.45 kg/(m^2).    PHYSICAL EXAM:  Constitutional:  Appearance: Well nourished, well developed, alert, in no acute distress  Neck:  Lymph Nodes:  No lymphadenopathy present    Thyroid:  Gland size normal, nontender, no nodules or masses present  on palpation  Chest:  Respiratory Effort:  Breathing unlabored  Cardiovascular:    Heart: Auscultation:  Regular rate, normal rhythm, no murmurs present  Breasts: Inspection of Breasts:  No lymphadenopathy present., Palpation of Breasts and Axillae:  No masses present on palpation, no breast tenderness., Axillary Lymph Nodes:  No lymphadenopathy present. and No nodularity, asymmetry or nipple discharge bilaterally.  Gastrointestinal:   Abdominal Examination:  Abdomen nontender to palpation, tone normal without " rigidity or guarding, no masses present, umbilicus without lesions   Liver and Spleen:  No hepatomegaly present, liver nontender to palpation    Hernias:  No hernias present  Lymphatic: Lymph Nodes:  No other lymphadenopathy present  Skin:  General Inspection:  No rashes present, no lesions present, no areas of  discoloration    Genitalia and Groin:  No rashes present, no lesions present, no areas of  discoloration, no masses present  Neurologic/Psychiatric:    Mental Status:  Oriented X3     Pelvic Exam:  External Genitalia:     Normal appearance for age, no discharge present, no tenderness present, no inflammatory lesions present, color normal  Vagina:     Normal vaginal vault without central or paravaginal defects, no discharge present, no inflammatory lesions present, no masses present  Bladder:     Nontender to palpation  Urethra:   Urethral Body:  Urethra palpation normal, urethra structural support normal   Urethral Meatus:  No erythema or lesions present  Cervix:     Appearance healthy, no lesions present, nontender to palpation, no bleeding present  Uterus:     Uterus: firm, normal sized and nontender, retroverted in position.   Adnexa:     No adnexal tenderness present, no adnexal masses present  Perineum:     Perineum within normal limits, no evidence of trauma, no rashes or skin lesions present  Anus:     Anus within normal limits, no hemorrhoids present  Inguinal Lymph Nodes:     No lymphadenopathy present  Pubic Hair:     Normal pubic hair distribution for age  Genitalia and Groin:     No rashes present, no lesions present, no areas of discoloration, no masses present      COUNSELING:   Reviewed preventive health counseling, as reflected in patient instructions       Contraception       Osteoporosis Prevention/Bone Health    BMI: Body mass index is 24.45 kg/(m^2).      ASSESSMENT:  32 year old female with satisfactory annual exam.    ICD-10-CM    1. Encounter for gynecological examination without  abnormal finding Z01.419    2. Uses contraception Z30.9 ORTHO-CYCLEN, 28, 0.25-35 MG-MCG per tablet   3. Encounter for lipid screening for cardiovascular disease Z13.220 Lipid panel reflex to direct LDL Fasting    Z13.6        PLAN:  -UTD for cervical cancer screening. Reviewed guidelines-pap q 3yrs until age 30 when co-testing q 5 years.  -Breast self awareness discussed.   -Discussed exercise-making plan, strength training. Nutrition encouraged.  -Colonoscopy age 48  -Osteoporosis prevention discussed.  -Desires fasting cholesterol labs. Discussed had done last year, not indicated but pt desired.  -Refilled OCP. Should initiate the prior auth at pharmacy for her name brand pills.   -Return one year for next annual exam      Gisele De Leon Masters, DO

## 2018-02-20 ASSESSMENT — PATIENT HEALTH QUESTIONNAIRE - PHQ9: SUM OF ALL RESPONSES TO PHQ QUESTIONS 1-9: 6

## 2018-02-20 ASSESSMENT — ANXIETY QUESTIONNAIRES: GAD7 TOTAL SCORE: 11

## 2018-02-23 NOTE — PROGRESS NOTES
Labs are normal. Improvement since last year!  Please call pt to notify.     Gisele De Leon Masters, DO

## 2019-03-08 DIAGNOSIS — Z78.9 USES CONTRACEPTION: ICD-10-CM

## 2019-03-08 RX ORDER — NORGESTIMATE AND ETHINYL ESTRADIOL 0.25-0.035
KIT ORAL
Qty: 28 TABLET | Refills: 3 | OUTPATIENT
Start: 2019-03-08

## 2019-03-08 NOTE — TELEPHONE ENCOUNTER
"Requested Prescriptions   Pending Prescriptions Disp Refills     SPRINTEC 28 0.25-35 MG-MCG tablet [Pharmacy Med Name: SPRINTEC 28 DAY TABLET] 28 tablet 3     Sig: TAKE 1 TABLET BY MOUTH EVERY DAY    Contraceptives Protocol Failed - 3/8/2019  1:30 AM       Failed - Recent (12 mo) or future (30 days) visit within the authorizing provider's specialty    Patient had office visit in the last 12 months or has a visit in the next 30 days with authorizing provider or within the authorizing provider's specialty.  See \"Patient Info\" tab in inbasket, or \"Choose Columns\" in Meds & Orders section of the refill encounter.             Passed - Patient is not a current smoker if age is 35 or older       Passed - Medication is active on med list       Passed - No active pregnancy on record       Passed - No positive pregnancy test in past 12 months        Pt has refills available  Vonda Murdock RN on 3/8/2019 at 7:54 AM    "

## 2019-03-11 ENCOUNTER — TELEPHONE (OUTPATIENT)
Dept: OBGYN | Facility: CLINIC | Age: 34
End: 2019-03-11

## 2019-03-11 DIAGNOSIS — Z78.9 USES CONTRACEPTION: ICD-10-CM

## 2019-03-11 NOTE — TELEPHONE ENCOUNTER
Requested Prescriptions   Pending Prescriptions Disp Refills     ORTHO-CYCLEN, 28, 0.25-35 MG-MCG tablet 28 tablet 0     Sig: Take 1 tablet by mouth daily    There is no refill protocol information for this order        Last Written Prescription Date:  2/19/18  Last Fill Quantity: 84,  # refills: 4   Last office visit: 2/19/2018 with prescribing provider:  Anmol   Future Office Visit:   Next 5 appointments (look out 90 days)    Mar 21, 2019  9:30 AM CDT  PHYSICAL with Gisele Velas, DO  [facility] ([facility]) [address] [address]  [phone]         Medication is being filled for 1 time refill only due to:  Patient needs to be seen because it has been more than one year since last visit. Has annual exam scheduled.

## 2019-03-11 NOTE — TELEPHONE ENCOUNTER
"[facility] Pharmacist calling and Rx sent as \"ACTA\"  Pt has been receiving Sprintec and pt did verbalize earlier in she has been fine with the Sprintec as the Ortho-Cyclen is difficult to find.   Ok given over the phone to remove the CATA and provide the Sprintec.  "

## 2019-03-21 ENCOUNTER — RESULT FOLLOW UP (OUTPATIENT)
Dept: OBGYN | Facility: CLINIC | Age: 34
End: 2019-03-21

## 2019-03-21 ENCOUNTER — OFFICE VISIT (OUTPATIENT)
Dept: OBGYN | Facility: CLINIC | Age: 34
End: 2019-03-21
Payer: COMMERCIAL

## 2019-03-21 VITALS
HEIGHT: 69 IN | DIASTOLIC BLOOD PRESSURE: 80 MMHG | BODY MASS INDEX: 26.81 KG/M2 | SYSTOLIC BLOOD PRESSURE: 116 MMHG | WEIGHT: 181 LBS

## 2019-03-21 DIAGNOSIS — Z11.8 SCREENING FOR CHLAMYDIAL DISEASE: ICD-10-CM

## 2019-03-21 DIAGNOSIS — Z78.9 USES CONTRACEPTION: ICD-10-CM

## 2019-03-21 DIAGNOSIS — Z11.3 SCREEN FOR STD (SEXUALLY TRANSMITTED DISEASE): ICD-10-CM

## 2019-03-21 DIAGNOSIS — R87.810 CERVICAL HIGH RISK HPV (HUMAN PAPILLOMAVIRUS) TEST POSITIVE: ICD-10-CM

## 2019-03-21 DIAGNOSIS — Z01.419 ENCOUNTER FOR GYNECOLOGICAL EXAMINATION WITHOUT ABNORMAL FINDING: Primary | ICD-10-CM

## 2019-03-21 DIAGNOSIS — Z83.49 FAMILY HISTORY OF THYROID DISEASE: ICD-10-CM

## 2019-03-21 DIAGNOSIS — Z11.4 SCREENING FOR HIV (HUMAN IMMUNODEFICIENCY VIRUS): ICD-10-CM

## 2019-03-21 PROCEDURE — 87340 HEPATITIS B SURFACE AG IA: CPT | Performed by: OBSTETRICS & GYNECOLOGY

## 2019-03-21 PROCEDURE — 99395 PREV VISIT EST AGE 18-39: CPT | Performed by: OBSTETRICS & GYNECOLOGY

## 2019-03-21 PROCEDURE — 84443 ASSAY THYROID STIM HORMONE: CPT | Performed by: OBSTETRICS & GYNECOLOGY

## 2019-03-21 PROCEDURE — 87624 HPV HI-RISK TYP POOLED RSLT: CPT | Performed by: OBSTETRICS & GYNECOLOGY

## 2019-03-21 PROCEDURE — 87491 CHLMYD TRACH DNA AMP PROBE: CPT | Performed by: OBSTETRICS & GYNECOLOGY

## 2019-03-21 PROCEDURE — 87591 N.GONORRHOEAE DNA AMP PROB: CPT | Performed by: OBSTETRICS & GYNECOLOGY

## 2019-03-21 PROCEDURE — G0145 SCR C/V CYTO,THINLAYER,RESCR: HCPCS | Performed by: OBSTETRICS & GYNECOLOGY

## 2019-03-21 PROCEDURE — 0064U ANTB TP TOTAL&RPR IA QUAL: CPT | Performed by: OBSTETRICS & GYNECOLOGY

## 2019-03-21 PROCEDURE — 36415 COLL VENOUS BLD VENIPUNCTURE: CPT | Performed by: OBSTETRICS & GYNECOLOGY

## 2019-03-21 PROCEDURE — 87389 HIV-1 AG W/HIV-1&-2 AB AG IA: CPT | Performed by: OBSTETRICS & GYNECOLOGY

## 2019-03-21 ASSESSMENT — ANXIETY QUESTIONNAIRES
6. BECOMING EASILY ANNOYED OR IRRITABLE: NEARLY EVERY DAY
GAD7 TOTAL SCORE: 11
3. WORRYING TOO MUCH ABOUT DIFFERENT THINGS: MORE THAN HALF THE DAYS
IF YOU CHECKED OFF ANY PROBLEMS ON THIS QUESTIONNAIRE, HOW DIFFICULT HAVE THESE PROBLEMS MADE IT FOR YOU TO DO YOUR WORK, TAKE CARE OF THINGS AT HOME, OR GET ALONG WITH OTHER PEOPLE: SOMEWHAT DIFFICULT
1. FEELING NERVOUS, ANXIOUS, OR ON EDGE: MORE THAN HALF THE DAYS
7. FEELING AFRAID AS IF SOMETHING AWFUL MIGHT HAPPEN: MORE THAN HALF THE DAYS
2. NOT BEING ABLE TO STOP OR CONTROL WORRYING: SEVERAL DAYS
5. BEING SO RESTLESS THAT IT IS HARD TO SIT STILL: NOT AT ALL

## 2019-03-21 ASSESSMENT — PATIENT HEALTH QUESTIONNAIRE - PHQ9
5. POOR APPETITE OR OVEREATING: SEVERAL DAYS
SUM OF ALL RESPONSES TO PHQ QUESTIONS 1-9: 5

## 2019-03-21 ASSESSMENT — MIFFLIN-ST. JEOR: SCORE: 1580.39

## 2019-03-21 NOTE — PROGRESS NOTES
Beth is a 33 year old No obstetric history on file. female who presents for annual exam.     Besides routine health maintenance,  she would like to discuss worried about endometriosis and discuss OCP.    HPI:  The patient does not have a PCP     Has been on OCps since age 17yo-started for cramping/bleeding control- had gone off for little bit and had really bad cramps and heavy flow. Wondering if she should go off.   Current partner has vasectomy.   Not sure she will want to have kids.    Feels like anxiety is getting better overall-was circumstantial (new job, support system better). Less drinking.     Not exercising regularly.     Mom recently dx with hashimoto's thyroiditis.     Desires STI screen        GYNECOLOGIC HISTORY:    Patient's last menstrual period was 03/05/2019 (exact date).  Her current contraception method is: oral contraceptives.  She  reports that  has never smoked. she has never used smokeless tobacco.    Patient is sexually active.  STD testing offered?  Declined  Last PHQ-9 score on record =   PHQ-9 SCORE 3/21/2019   PHQ-9 Total Score 5     Last GAD7 score on record =   MC-7 SCORE 3/21/2019   Total Score 11     Alcohol Score = 8    HEALTH MAINTENANCE:  Cholesterol:   Cholesterol   Date Value Ref Range Status   02/19/2018 172 <200 mg/dL Final   01/06/2017 219 (H) <200 mg/dL Final     Comment:     Desirable:       <200 mg/dl    2/19/18   Total= 172, Triglycerides=72, HDL=83, LDL=75  Last Mammo: NA, Result: not applicable, Next Mammo: NA   Pap: 7/15/14 WNL HPV (-)neg  Colonoscopy:  NA, Result: not applicable, Next Colonoscopy: NA years.  Dexa:  NA    Health maintenance updated:  yes    HISTORY:  Obstetric History     No data available          There is no problem list on file for this patient.    Past Surgical History:   Procedure Laterality Date     COLPOSCOPY CERVIX, BIOPSY CERVIX, ENDOCERVICAL CURETTAGE, COMBINED        Social History     Tobacco Use     Smoking status: Never Smoker      "Smokeless tobacco: Never Used   Substance Use Topics     Alcohol use: Yes     Alcohol/week: 0.0 oz           Current Outpatient Medications   Medication Sig     ORTHO-CYCLEN, 28, 0.25-35 MG-MCG tablet Take 1 tablet by mouth daily     No current facility-administered medications for this visit.      No Known Allergies    Past medical, surgical, social and family histories were reviewed and updated in EPIC.    ROS:   12 point review of systems negative other than symptoms noted below.  Genitourinary: Significant PMS, Vaginal Discharge and Vaginal Itching  Endocrine: Loss of Hair    EXAM:  /80   Ht 1.753 m (5' 9\")   Wt 82.1 kg (181 lb)   LMP 03/05/2019 (Exact Date)   Breastfeeding? No   BMI 26.73 kg/m     BMI: Body mass index is 26.73 kg/m .    PHYSICAL EXAM:  Constitutional:  Appearance: Well nourished, well developed, alert, in no acute distress  Neck:  Lymph Nodes:  No lymphadenopathy present    Thyroid:  Gland size normal, nontender, no nodules or masses present  on palpation  Chest:  Respiratory Effort:  Breathing unlabored  Cardiovascular:    Heart: Auscultation:  Regular rate, normal rhythm, no murmurs present  Breasts: Inspection of Breasts:  No lymphadenopathy present., Palpation of Breasts and Axillae:  No masses present on palpation, no breast tenderness., Axillary Lymph Nodes:  No lymphadenopathy present. and No nodularity, asymmetry or nipple discharge bilaterally.  Gastrointestinal:   Abdominal Examination:  Abdomen nontender to palpation, tone normal without rigidity or guarding, no masses present, umbilicus without lesions   Liver and Spleen:  No hepatomegaly present, liver nontender to palpation    Hernias:  No hernias present  Lymphatic: Lymph Nodes:  No other lymphadenopathy present  Skin:  General Inspection:  No rashes present, no lesions present, no areas of  discoloration    Genitalia and Groin:  No rashes present, no lesions present, no areas of  discoloration, no masses " present  Neurologic/Psychiatric:    Mental Status:  Oriented X3     Pelvic Exam:  External Genitalia:     Normal appearance for age, no discharge present, no tenderness present, no inflammatory lesions present, color normal  Vagina:     Normal vaginal vault without central or paravaginal defects, no discharge present, no inflammatory lesions present, no masses present  Bladder:     Nontender to palpation  Urethra:   Urethral Body:  Urethra palpation normal, urethra structural support normal   Urethral Meatus:  No erythema or lesions present  Cervix:     Appearance healthy, no lesions present, nontender to palpation, no bleeding present  Uterus:     Uterus: firm, normal sized and nontender, midplane in position.   Adnexa:     No adnexal tenderness present, no adnexal masses present  Perineum:     Perineum within normal limits, no evidence of trauma, no rashes or skin lesions present  Anus:     Anus within normal limits, no hemorrhoids present  Inguinal Lymph Nodes:     No lymphadenopathy present  Pubic Hair:     Normal pubic hair distribution for age  Genitalia and Groin:     No rashes present, no lesions present, no areas of discoloration, no masses present      COUNSELING:   Reviewed preventive health counseling, as reflected in patient instructions       Regular exercise    BMI: Body mass index is 26.73 kg/m .  Weight management plan: Discussed healthy diet and exercise guidelines    ASSESSMENT:  33 year old female with satisfactory annual exam.    ICD-10-CM    1. Encounter for gynecological examination without abnormal finding Z01.419        PLAN:  -Pap/hpv obtained for cervical cancer screening. Reviewed guidelines-pap q 3yrs until age 30 when co-testing q 5 years.  -Breast self awareness discussed. Age 40 for mammogram.  -Discussed exercise-making plan, strength training. Nutrition encouraged.  -Colonoscopy age 45-50  -Osteoporosis prevention discussed.  -Desries STI labs.  x thyroid dz. Will check thyroid as  has not been done  -OCP refilled. Discussed balancing need with risks/benefits. May trial off to see how her periods go and other nonhormonal means to control cramping if has some. May prove if she needs it or not as is in relationship with vasectomy of partner where doesn't need contraception. Refill pills for now. Pt will consider.   -Return one year for next annual exam      Gisele De Leon Masters, DO

## 2019-03-22 LAB
C TRACH DNA SPEC QL NAA+PROBE: NEGATIVE
HBV SURFACE AG SERPL QL IA: NONREACTIVE
HIV 1+2 AB+HIV1 P24 AG SERPL QL IA: NONREACTIVE
N GONORRHOEA DNA SPEC QL NAA+PROBE: NEGATIVE
SPECIMEN SOURCE: NORMAL
SPECIMEN SOURCE: NORMAL
T PALLIDUM AB SER QL: NONREACTIVE
TSH SERPL DL<=0.005 MIU/L-ACNC: 2.2 MU/L (ref 0.4–4)

## 2019-03-22 ASSESSMENT — ANXIETY QUESTIONNAIRES: GAD7 TOTAL SCORE: 11

## 2019-03-25 LAB
COPATH REPORT: NORMAL
PAP: NORMAL

## 2019-03-27 LAB
FINAL DIAGNOSIS: ABNORMAL
HPV HR 12 DNA CVX QL NAA+PROBE: NEGATIVE
HPV16 DNA SPEC QL NAA+PROBE: POSITIVE
HPV18 DNA SPEC QL NAA+PROBE: NEGATIVE
SPECIMEN DESCRIPTION: ABNORMAL
SPECIMEN SOURCE CVX/VAG CYTO: ABNORMAL

## 2019-03-27 NOTE — PROGRESS NOTES
03/21/19: NIL pap, + HR HPV type 16 result. Plan [place] due bef 06/21/19.   3/28/19 left msg (lks)  3/29/19 Advised of result and follow up.(lks)  4/15/19 Colpo: BX/ECC-neg. Plan: cotest in 1 year (lks)  04/01/20 Due to COVID restrictions - Routed to RN to review recommendations . (es)  4/1/20 Per provider, COVID 19 interim plan updated to: plan: cotest in 6 months. Due by 10/15/20.  (lce)  4/2/20 left detailed VM with new recommendations per consent to communicate (lce)

## 2019-04-15 ENCOUNTER — OFFICE VISIT (OUTPATIENT)
Dept: OBGYN | Facility: CLINIC | Age: 34
End: 2019-04-15
Payer: COMMERCIAL

## 2019-04-15 VITALS
HEART RATE: 76 BPM | BODY MASS INDEX: 27.17 KG/M2 | SYSTOLIC BLOOD PRESSURE: 110 MMHG | WEIGHT: 184 LBS | DIASTOLIC BLOOD PRESSURE: 82 MMHG

## 2019-04-15 DIAGNOSIS — Z01.812 PRE-PROCEDURE LAB EXAM: Primary | ICD-10-CM

## 2019-04-15 DIAGNOSIS — R87.810 CERVICAL HIGH RISK HPV (HUMAN PAPILLOMAVIRUS) TEST POSITIVE: ICD-10-CM

## 2019-04-15 LAB — HCG UR QL: NEGATIVE

## 2019-04-15 PROCEDURE — 88305 TISSUE EXAM BY PATHOLOGIST: CPT | Performed by: OBSTETRICS & GYNECOLOGY

## 2019-04-15 PROCEDURE — 88342 IMHCHEM/IMCYTCHM 1ST ANTB: CPT | Performed by: OBSTETRICS & GYNECOLOGY

## 2019-04-15 PROCEDURE — 81025 URINE PREGNANCY TEST: CPT | Performed by: OBSTETRICS & GYNECOLOGY

## 2019-04-15 PROCEDURE — 57454 BX/CURETT OF CERVIX W/SCOPE: CPT | Performed by: OBSTETRICS & GYNECOLOGY

## 2019-04-15 NOTE — PROGRESS NOTES
INDICATIONS:                                                    Is a pregnancy test required: Yes.  Was it positive or negative?  Negative  Was a consent obtained?  Yes    Beth Bradley, is a 33 year old female, who had a recent negative pap.  HPV positive.  Yes prior history of abnormal pap. Here today for colposcopy. Discussed indication, risks of infection and bleeding.    Her last pap was   Lab Results   Component Value Date    PAP NIL 03/21/2019      Pap history:  2004 ASCUS, 2005 neg, 2007 ASCUS/HPV neg, 2008 neg/neg, 2009 neg, 2012 neg/neg, 2014 neg/neg. 3/19 NIL/HPV 16+    PROCEDURE:                                                      Cervix is stained with acetic acid and viewed colposcopically. Squamocolumnar junction is visualized in it's entirety. no visible lesions . Random biopsy done at 1:00. Endocervical curretage Done       POST PROCEDURE:                                                      IMPRESSION: Patient tolerated procedure well, colposcopy adequate and Normal cervix    PLAN : Await the results of the biopsies. There were no complications. Patient was discharged in stable condition.    Patient advised to call the clinic if excessive bleeding, pelvic pain, or fever.     Follow-up plan based on pathology results.    Gisele De Leon Masters, DO

## 2019-04-18 LAB — COPATH REPORT: NORMAL

## 2019-04-22 NOTE — RESULT ENCOUNTER NOTE
Biopsies were normal/negative. No further treatment needed at this time. Plan to follow up with pap/hpv in one year. Please call pt to notify.     Gisele Velas, DO

## 2020-04-01 ENCOUNTER — PATIENT OUTREACH (OUTPATIENT)
Dept: PEDIATRICS | Facility: CLINIC | Age: 35
End: 2020-04-01

## 2020-04-01 NOTE — TELEPHONE ENCOUNTER
Hi Dr Corea,    This patient is due for her one year cotest following normal colposcopy completed 4/15/19. Due to exposure concerns with COVID-19, please advise when you would like the patient to return to the office for her cotest. See pap history below:    2004ASCUS +HPV , possible colposcopy  2005 neg  2007 ASCUS/NEg HPV  2009 NIL pap  12/4/12 NIL pap, Neg HPV  7/15/14 NIL pap, Neg HPV  03/21/19: NIL pap, + HR HPV type 16 result. Plan [place].   4/15/19 Colpo: BX/ECC-neg. Plan: cotest in 1 year    Thanks!    Loli Schmidt RN BSN, Pap Tracking

## 2020-09-14 ENCOUNTER — PATIENT OUTREACH (OUTPATIENT)
Dept: OBGYN | Facility: CLINIC | Age: 35
End: 2020-09-14

## 2020-09-14 DIAGNOSIS — R87.810 CERVICAL HIGH RISK HPV (HUMAN PAPILLOMAVIRUS) TEST POSITIVE: ICD-10-CM

## 2020-09-14 NOTE — LETTER
September 14, 2020      Beth Bradley  [address] [address] [address]        Dear ,    At [facility], your health and wellness are our primary concern. That is why we are following up on your most recent Colposcopy.    Please call [phone] to schedule your appointment for your recommended follow-up Pap smear and Human Papillomavirus (HPV) test at your earliest convenience.     If you have completed the appointment outside of the [facility] system, please have the records forwarded to our office. We will update your chart for your provider to review before your next annual wellness visit.     Thank you for choosing [facility]!      Sincerely,    Your [facility] Care Team/alexis

## 2020-09-14 NOTE — LETTER
September 14, 2020      Beth Bradley  [address] [address] [address]    Dear MsMaciKirk,      At [facility], your health and wellness is our primary concern. That is why we are following up on {abnormal pap options:601600} from ***, which was reported as ***. Your provider had recommended that you have a {FMG PAP PROCEDURES:321339} completed by ***. Our records do not show that this has been {done or scheduled:884387}.    It is important to complete the follow up that your provider has suggested for you to ensure that there are no worsening changes which may, over time, develop into cancer.      Please contact our office at  [phone] to schedule an appointment for a {FMG PAP PROCEDURES2:756579} at your earliest convenience. If you have questions or concerns, please call the clinic and we will be happy to assist you.    If you have completed the tests outside of [facility], please have the results forwarded to our office. We will update the chart for your primary Physician to review before your next annual physical.     Thank you for choosing [facility]!    Sincerely,      Your [facility] Care Team

## 2020-10-13 ENCOUNTER — PATIENT OUTREACH (OUTPATIENT)
Dept: OBGYN | Facility: CLINIC | Age: 35
End: 2020-10-13

## 2020-10-13 DIAGNOSIS — R87.810 CERVICAL HIGH RISK HPV (HUMAN PAPILLOMAVIRUS) TEST POSITIVE: ICD-10-CM

## 2020-12-30 NOTE — PROGRESS NOTES
Beth is a 35 year old  female who presents for annual exam.     Besides routine health maintenance,  she would like to discuss some spotting between her last cycle, she has been off OCP's for about a year due to partner having vasectomy.    HPI:  The patient's PCP is  Physician No Ref-Primary.      Off OCp x 1yr, boyfriend with vasectomy. Periods are awful.  Having a lot of cramps, takes ibuprofen. Will start cramping with radiation into legs with bleeding. Will be down for one day. Ibuprofen helps.   Has noticed more acne as well.   Feels less anxious not on the OCPs.     Exercising regularly.       GYNECOLOGIC HISTORY:    Patient's last menstrual period was 2020 (approximate).    Regular menses? yes  Menses every 28 days.  Length of menses: 5 days    Her current contraception method is: vasectomy.  She  reports that she has never smoked. She has never used smokeless tobacco.    Patient is sexually active.  STD testing offered?  Declined  Last PHQ-9 score on record =   PHQ-9 SCORE 2020   PHQ-9 Total Score 3     Last GAD7 score on record =   MC-7 SCORE 2020   Total Score 4     Alcohol Score = 4    HEALTH MAINTENANCE:  Cholesterol: (  Recent Labs   Lab Test 18  0903 17  0858   CHOL 172 219*   HDL 83 101   LDL 75 104*   TRIG 72 72       Last Mammo: Not applicable, Result: Not applicable, Next Mammo: due age 40   Pap:   Lab Results   Component Value Date    PAP NIL 2019      Colonoscopy:  NA, Result: Not applicable, Next Colonoscopy: age 45-50 years.  Dexa:  NA    Health maintenance updated:  yes    HISTORY:  OB History    Para Term  AB Living   0 0 0 0 0 0   SAB TAB Ectopic Multiple Live Births   0 0 0 0 0       Patient Active Problem List   Diagnosis     Cervical high risk HPV (human papillomavirus) test positive     Past Surgical History:   Procedure Laterality Date     COLPOSCOPY CERVIX, BIOPSY CERVIX, ENDOCERVICAL CURETTAGE, COMBINED        Social  "History     Tobacco Use     Smoking status: Never Smoker     Smokeless tobacco: Never Used   Substance Use Topics     Alcohol use: Yes     Alcohol/week: 0.0 standard drinks      Problem (# of Occurrences) Relation (Name,Age of Onset)    Autoimmune Disease (1) Mother    Hashimoto's thyroiditis (1) Mother    Hypothyroidism (1) Mother            No current outpatient medications on file.     No current facility-administered medications for this visit.      No Known Allergies    Past medical, surgical, social and family histories were reviewed and updated in EPIC.    ROS:   12 point review of systems negative other than symptoms noted below or in the HPI.      EXAM:  /68   Ht 1.753 m (5' 9\")   Wt 82.6 kg (182 lb 3.2 oz)   LMP 12/23/2020 (Approximate)   Breastfeeding No   BMI 26.91 kg/m     BMI: Body mass index is 26.91 kg/m .    PHYSICAL EXAM:  Constitutional:   Appearance: Well nourished, well developed, alert, in no acute distress  Neck:  Lymph Nodes:  No lymphadenopathy present    Thyroid:  Gland size normal, nontender, no nodules or masses present  on palpation  Chest:  Respiratory Effort:  Breathing unlabored  Cardiovascular:    Heart: Auscultation:  Regular rate, normal rhythm, no murmurs present  Breasts: Inspection of Breasts:  No lymphadenopathy present., Palpation of Breasts and Axillae:  No masses present on palpation, no breast tenderness., Axillary Lymph Nodes:  No lymphadenopathy present. and No nodularity, asymmetry or nipple discharge bilaterally.  Gastrointestinal:   Abdominal Examination:  Abdomen nontender to palpation, tone normal without rigidity or guarding, no masses present, umbilicus without lesions   Liver and Spleen:  No hepatomegaly present, liver nontender to palpation    Hernias:  No hernias present  Lymphatic: Lymph Nodes:  No other lymphadenopathy present  Skin:  General Inspection:  No rashes present, no lesions present, no areas of  discoloration  Neurologic:    Mental " Status:  Oriented X3.  Normal strength and tone, sensory exam                grossly normal, mentation intact and speech normal.    Psychiatric:   Mentation appears normal and affect normal/bright.         Pelvic Exam:  External Genitalia:     Normal appearance for age, no discharge present, no tenderness present, no inflammatory lesions present, color normal  Vagina:     Normal vaginal vault without central or paravaginal defects, no discharge present, no inflammatory lesions present, no masses present  Bladder:     Nontender to palpation  Urethra:   Urethral Body:  Urethra palpation normal, urethra structural support normal   Urethral Meatus:  No erythema or lesions present  Cervix:     Appearance healthy, no lesions present, nontender to palpation, no bleeding present  Uterus:     Uterus: firm, normal sized and nontender, midplane in position.   Adnexa:     No adnexal tenderness present, no adnexal masses present  Perineum:     Perineum within normal limits, no evidence of trauma, no rashes or skin lesions present  Anus:     Anus within normal limits, no hemorrhoids present  Inguinal Lymph Nodes:     No lymphadenopathy present  Pubic Hair:     Normal pubic hair distribution for age  Genitalia and Groin:     No rashes present, no lesions present, no areas of discoloration, no masses present      COUNSELING:   Reviewed preventive health counseling, as reflected in patient instructions       Osteoporosis prevention/bone health    BMI: Body mass index is 26.91 kg/m .  Weight management plan: Discussed healthy diet and exercise guidelines    ASSESSMENT:  35 year old female with satisfactory annual exam.    ICD-10-CM    1. Encounter for gynecological examination without abnormal finding  Z01.419    2. Screening for cervical cancer  Z12.4 Pap imaged thin layer screen with HPV - recommended age 30 - 65     HPV High Risk Types DNA Cervical   3. Family history of thyroid disease  Z83.49 TSH with free T4 reflex   4.  Encounter for lipid screening for cardiovascular disease  Z13.220 Lipid panel reflex to direct LDL Fasting    Z13.6    5. BMI 26.0-26.9,adult  Z68.26 Lipid panel reflex to direct LDL Fasting     TSH with free T4 reflex       PLAN:  -Pap/hpv obtained for cervical cancer screening.  Follow-up pending pathology result.  -Breast self awareness discussed. Age 40 for mammogram.  -Discussed exercise-making plan, strength training. Nutrition encouraged.  -Colonoscopy age 45  -Osteoporosis prevention discussed.  -Fasting and would like thyroid and cholesterol tested today.  Lab is reviewed  -Return one year for next annual exam          Gisele De Leon Masters, DO

## 2020-12-31 ENCOUNTER — OFFICE VISIT (OUTPATIENT)
Dept: OBGYN | Facility: CLINIC | Age: 35
End: 2020-12-31
Payer: COMMERCIAL

## 2020-12-31 VITALS
WEIGHT: 182.2 LBS | SYSTOLIC BLOOD PRESSURE: 104 MMHG | DIASTOLIC BLOOD PRESSURE: 68 MMHG | BODY MASS INDEX: 26.98 KG/M2 | HEIGHT: 69 IN

## 2020-12-31 DIAGNOSIS — Z01.419 ENCOUNTER FOR GYNECOLOGICAL EXAMINATION WITHOUT ABNORMAL FINDING: Primary | ICD-10-CM

## 2020-12-31 DIAGNOSIS — Z13.220 ENCOUNTER FOR LIPID SCREENING FOR CARDIOVASCULAR DISEASE: ICD-10-CM

## 2020-12-31 DIAGNOSIS — Z13.6 ENCOUNTER FOR LIPID SCREENING FOR CARDIOVASCULAR DISEASE: ICD-10-CM

## 2020-12-31 DIAGNOSIS — Z12.4 SCREENING FOR CERVICAL CANCER: ICD-10-CM

## 2020-12-31 DIAGNOSIS — Z83.49 FAMILY HISTORY OF THYROID DISEASE: ICD-10-CM

## 2020-12-31 PROCEDURE — 87624 HPV HI-RISK TYP POOLED RSLT: CPT | Performed by: OBSTETRICS & GYNECOLOGY

## 2020-12-31 PROCEDURE — 99395 PREV VISIT EST AGE 18-39: CPT | Performed by: OBSTETRICS & GYNECOLOGY

## 2020-12-31 PROCEDURE — G0145 SCR C/V CYTO,THINLAYER,RESCR: HCPCS | Performed by: OBSTETRICS & GYNECOLOGY

## 2020-12-31 PROCEDURE — 84443 ASSAY THYROID STIM HORMONE: CPT | Performed by: OBSTETRICS & GYNECOLOGY

## 2020-12-31 PROCEDURE — 80061 LIPID PANEL: CPT | Performed by: OBSTETRICS & GYNECOLOGY

## 2020-12-31 PROCEDURE — 36415 COLL VENOUS BLD VENIPUNCTURE: CPT | Performed by: OBSTETRICS & GYNECOLOGY

## 2020-12-31 ASSESSMENT — ANXIETY QUESTIONNAIRES
3. WORRYING TOO MUCH ABOUT DIFFERENT THINGS: SEVERAL DAYS
5. BEING SO RESTLESS THAT IT IS HARD TO SIT STILL: NOT AT ALL
GAD7 TOTAL SCORE: 4
1. FEELING NERVOUS, ANXIOUS, OR ON EDGE: SEVERAL DAYS
7. FEELING AFRAID AS IF SOMETHING AWFUL MIGHT HAPPEN: NOT AT ALL
6. BECOMING EASILY ANNOYED OR IRRITABLE: SEVERAL DAYS
IF YOU CHECKED OFF ANY PROBLEMS ON THIS QUESTIONNAIRE, HOW DIFFICULT HAVE THESE PROBLEMS MADE IT FOR YOU TO DO YOUR WORK, TAKE CARE OF THINGS AT HOME, OR GET ALONG WITH OTHER PEOPLE: NOT DIFFICULT AT ALL
2. NOT BEING ABLE TO STOP OR CONTROL WORRYING: SEVERAL DAYS

## 2020-12-31 ASSESSMENT — MIFFLIN-ST. JEOR: SCORE: 1575.83

## 2020-12-31 ASSESSMENT — PATIENT HEALTH QUESTIONNAIRE - PHQ9
SUM OF ALL RESPONSES TO PHQ QUESTIONS 1-9: 3
5. POOR APPETITE OR OVEREATING: NOT AT ALL

## 2020-12-31 NOTE — PATIENT INSTRUCTIONS
-Daily total calcium intake (between food/supplements) should be 1000mg which equates to 3-4 servings calcium containing food per day; VItamin D 1000IU.   Foods rich in calcium are: milk, cheese, yogurt, seafood, sardines and canned salmon, leafy green vegetables such as delfino greens, spinach and kale, beans and lentils, almonds, seeds (poppy, sesame, celery, tamara), rhubarb, dried fruit such as figs, whey protein, tofu and edamame, amaranth, other foods with added calcium such as orange juice and some cereals.   If adequate amount not taken in diet, then a supplement may be needed.     -I also recommend increasing your dietary fiber by starting Metamucil (powder mixed in glass of water) twice daily

## 2021-01-01 LAB
CHOLEST SERPL-MCNC: 217 MG/DL
HDLC SERPL-MCNC: 78 MG/DL
LDLC SERPL CALC-MCNC: 127 MG/DL
NONHDLC SERPL-MCNC: 139 MG/DL
TRIGL SERPL-MCNC: 60 MG/DL
TSH SERPL DL<=0.005 MIU/L-ACNC: 2.04 MU/L (ref 0.4–4)

## 2021-01-01 ASSESSMENT — ANXIETY QUESTIONNAIRES: GAD7 TOTAL SCORE: 4

## 2021-01-04 NOTE — RESULT ENCOUNTER NOTE
Thyroid is normal.   Cholesterol is good. Her goal would be less than 130 for LDL.  Please call pt to notify.     Gisele Velas, DO

## 2021-01-05 LAB
COPATH REPORT: NORMAL
PAP: NORMAL

## 2021-01-06 LAB
FINAL DIAGNOSIS: ABNORMAL
HPV HR 12 DNA CVX QL NAA+PROBE: POSITIVE
HPV16 DNA SPEC QL NAA+PROBE: NEGATIVE
HPV18 DNA SPEC QL NAA+PROBE: NEGATIVE
SPECIMEN DESCRIPTION: ABNORMAL
SPECIMEN SOURCE CVX/VAG CYTO: ABNORMAL

## 2021-01-07 PROBLEM — R87.810 CERVICAL HIGH RISK HPV (HUMAN PAPILLOMAVIRUS) TEST POSITIVE: Status: ACTIVE | Noted: 2019-03-21

## 2021-01-18 ENCOUNTER — PATIENT OUTREACH (OUTPATIENT)
Dept: OBGYN | Facility: CLINIC | Age: 36
End: 2021-01-18

## 2021-01-18 DIAGNOSIS — R87.810 CERVICAL HIGH RISK HPV (HUMAN PAPILLOMAVIRUS) TEST POSITIVE: ICD-10-CM

## 2021-08-18 ENCOUNTER — TELEPHONE (OUTPATIENT)
Dept: OBGYN | Facility: CLINIC | Age: 36
End: 2021-08-18

## 2021-08-18 DIAGNOSIS — Z82.49 FAMILY HISTORY OF HEART DISEASE: Primary | ICD-10-CM

## 2021-08-18 NOTE — TELEPHONE ENCOUNTER
Returned pt call to get more information  Last annual with Dr Corea 12/31/21    Her father was Dx heart condition that could be genetic and was recommended to be screened by cardiology herself. She has future apt 11/16/2021 w Dr Diamante Espinoza Cardiologist. They told patient if her PCP places an order for echocardiogram (comlete) she could get this scheduled/completed prior to her future apt w cardiologist.    Father's dx: Left ventricular dysfunction and bundle block  Had pacemaker and defibrillator placed.    Pended order and attached dx. This nurse called cardiology and confirmed the request and appropriate order that was needed.    Routing to Dr Corea - ok to sign?    Will need to call pt back w response.  Korina Houser RN on 8/18/2021 at 4:47 PM

## 2021-08-20 NOTE — TELEPHONE ENCOUNTER
Called pt with information, no answer, left detailed msg of order placed and phone number to call at  Radiology to schedule [phone]    Lucy Ling RN on 8/20/2021 at 3:38 PM

## 2021-09-17 ENCOUNTER — HOSPITAL ENCOUNTER (OUTPATIENT)
Dept: CARDIOLOGY | Facility: CLINIC | Age: 36
Discharge: HOME | End: 2021-09-17
Attending: OBSTETRICS & GYNECOLOGY | Admitting: OBSTETRICS & GYNECOLOGY
Payer: COMMERCIAL

## 2021-09-17 DIAGNOSIS — Z82.49 FAMILY HISTORY OF HEART DISEASE: ICD-10-CM

## 2021-09-17 LAB — LVEF ECHO: NORMAL

## 2021-09-17 PROCEDURE — 93306 TTE W/DOPPLER COMPLETE: CPT | Mod: 26 | Performed by: INTERNAL MEDICINE

## 2021-09-17 PROCEDURE — 93306 TTE W/DOPPLER COMPLETE: CPT

## 2021-09-21 NOTE — RESULT ENCOUNTER NOTE
It appears her echo is normal.  However she needs to keep her scheduled cardiology appointment and this exam can be discussed further, as well as any future follow-up needed.  Please call pt to notify.     Gisele Corea, DO

## 2021-10-11 ENCOUNTER — OFFICE VISIT (OUTPATIENT)
Dept: PEDIATRICS | Facility: CLINIC | Age: 36
End: 2021-10-11
Payer: COMMERCIAL

## 2021-10-11 VITALS
HEART RATE: 66 BPM | TEMPERATURE: 97.6 F | OXYGEN SATURATION: 100 % | DIASTOLIC BLOOD PRESSURE: 74 MMHG | WEIGHT: 163.2 LBS | BODY MASS INDEX: 24.1 KG/M2 | SYSTOLIC BLOOD PRESSURE: 108 MMHG

## 2021-10-11 DIAGNOSIS — B02.9 HERPES ZOSTER WITHOUT COMPLICATION: Primary | ICD-10-CM

## 2021-10-11 PROCEDURE — 99203 OFFICE O/P NEW LOW 30 MIN: CPT | Performed by: PHYSICIAN ASSISTANT

## 2021-10-11 RX ORDER — VALACYCLOVIR HYDROCHLORIDE 1 G/1
1000 TABLET, FILM COATED ORAL 3 TIMES DAILY
Qty: 21 TABLET | Refills: 0 | Status: SHIPPED | OUTPATIENT
Start: 2021-10-11 | End: 2023-07-26

## 2021-10-11 NOTE — PROGRESS NOTES
Assessment & Plan     Herpes zoster without complication  Begin treatment as directed.  - valACYclovir (VALTREX) 1000 mg tablet; Take 1 tablet (1,000 mg) by mouth 3 times daily    Lio Mas PA-C  [facility] MALORIE Campos is a 36 year old who presents for the following health issues  accompanied by her :    HPI   Concern - Shingles   Onset: x2 days   Description: Blistery, red raised, a little tingling sensation   Intensity: mild, moderate  Progression of Symptoms:  worsening  Accompanying Signs & Symptoms: burning sensation  Previous history of similar problem: no   Precipitating factors:        Worsened by: wearing a bra   Alleviating factors:        Improved by: na   Therapies tried and outcome:  none     History of chicken pox.    Review of Systems   Constitutional, HEENT, cardiovascular, pulmonary, gi and gu systems are negative, except as otherwise noted.      Objective    /74 (BP Location: Right arm, Patient Position: Chair, Cuff Size: Adult Regular)   Pulse 66   Temp 97.6  F (36.4  C) (Tympanic)   Wt 74 kg (163 lb 3.2 oz)   SpO2 100%   BMI 24.10 kg/m    Body mass index is 24.1 kg/m .  Physical Exam   GENERAL: alert and no distress  RESP: lungs clear to auscultation - no rales, rhonchi or wheezes  CV: regular rate and rhythm, normal S1 S2, no S3 or S4  SKIN: inspection of the left abdomen reveals multiple grouped erythemic vesicles    No results found for any visits on 10/11/21.

## 2021-10-12 ENCOUNTER — NURSE TRIAGE (OUTPATIENT)
Dept: NURSING | Facility: CLINIC | Age: 36
End: 2021-10-12

## 2021-10-12 NOTE — TELEPHONE ENCOUNTER
Patient calling, she reports she was diagnosed with shingles yesterday, and wants to know if she can jog with shingles, and if they are contagious.    She was advised on these questions.    Tammi Barriga RN RN  [facility] Triage/refill nurse    Reason for Disposition    Shingles, questions about    Additional Information    Negative: Difficult to awaken or acting confused (e.g., disoriented, slurred speech)    Negative: Sounds like a life-threatening emergency to the triager    Negative: Localized rash and doesn't match the SYMPTOMS of shingles    Negative: Back pain and doesn't match the SYMPTOMS of shingles    Negative: Shingles Vaccine (Recombinant Zoster Vaccine; RZV; Shingrix), questions about    Negative: Shingles rash of face and eye pain or blurred vision    Negative: Shingles rash on the eyelid or tip of the nose    Negative: Shingles rash and spots start appearing other places on body    Negative: Patient sounds very sick or weak to the triager    Negative: Shingles rash (matches SYMPTOMS) and weak immune system (e.g., HIV positive,  cancer chemotherapy, chronic steroid treatment, splenectomy) and NOT taking antiviral medication    Negative: Shingles rash of face and facial weakness    Negative: Shingles rash of face or ear and earache or ringing in the ear    Negative: Fever > 100.4 F (38.0 C)    Negative: SEVERE pain (e.g., excruciating)    Negative: Shingles rash (matches SYMPTOMS) and onset within past 72 hours    Negative: Looks infected (spreading redness, pus) and no fever    Negative: Patient wants to be seen    Negative: Shingles rash and onset > 72 hours ago    Negative: Shingle rash already diagnosed and weak immune system (e.g., HIV positive,  cancer chemotherapy, chronic steroid treatment, splenectomy) and  taking antiviral medication    Negative: Pain lasting > 1 month after rash disappears    Negative: Shingles rash already diagnosed and taking antiviral medication    Protocols  used: SHINGLES-A-OH

## 2021-11-19 ENCOUNTER — OFFICE VISIT (OUTPATIENT)
Dept: CARDIOLOGY | Facility: CLINIC | Age: 36
End: 2021-11-19
Payer: COMMERCIAL

## 2021-11-19 VITALS
DIASTOLIC BLOOD PRESSURE: 68 MMHG | SYSTOLIC BLOOD PRESSURE: 106 MMHG | HEIGHT: 68 IN | OXYGEN SATURATION: 99 % | WEIGHT: 165.2 LBS | BODY MASS INDEX: 25.04 KG/M2 | HEART RATE: 86 BPM

## 2021-11-19 DIAGNOSIS — Z82.49 FAMILY HISTORY OF HEART DISEASE: Primary | ICD-10-CM

## 2021-11-19 PROCEDURE — 99203 OFFICE O/P NEW LOW 30 MIN: CPT | Performed by: INTERNAL MEDICINE

## 2021-11-19 PROCEDURE — 93000 ELECTROCARDIOGRAM COMPLETE: CPT | Performed by: INTERNAL MEDICINE

## 2021-11-19 ASSESSMENT — MIFFLIN-ST. JEOR: SCORE: 1483.24

## 2021-11-19 NOTE — PROGRESS NOTES
"Cardiology Consultation      Beth Bradley MRN# [id number]   YOB: 1985 Age: 36 year old   Date of Visit 11/19/2021     Reason for consult:  Family history of cardiomyopathy           Assessment and Plan:     1. Family history of cardiomyopathy, no echocardiographic or electrocardiographic abnormalities and no cardiovascular symptoms    Discussed that she is likely doing very well and we do not see any structural or electrical cardiac issues at this time.  If she develops any symptoms, she will let us know and we can revisit with further evaluation.    This note was transcribed using electronic voice recognition software, typographical errors may be present.                Chief Complaint:   Follow Up           History of Present Illness:   This patient is a very pleasant 36 year old female whose sister is Jie Marques and father is Trung Bradley.  Her father has had cardiomyopathy and congestive heart failure.  She wanted evaluation to make sure she was doing okay.  She denies any cardiovascular symptoms.  Her echocardiogram is normal.  Her ECG is normal.    No exertional chest pain  No PND / orthopnea  No presyncope / syncope  No significant palpitations           Physical Exam:     Vitals: /68 (BP Location: Right arm, Patient Position: Sitting, Cuff Size: Adult Regular)   Pulse 86   Ht 1.736 m (5' 8.34\")   Wt 74.9 kg (165 lb 3.2 oz)   SpO2 99%   BMI 24.87 kg/m    Constitutional:  cooperative, alert and oriented, well developed, well nourished, in no acute distress        Skin:  warm and dry to the touch, no apparent skin lesions or masses noted        Head:  normocephalic, no masses or lesions        Eyes:  pupils equal and round, conjunctivae and lids unremarkable, sclera white, no xanthalasma, EOMS intact, no nystagmus        ENT:  no pallor or cyanosis        Neck:  JVP normal        Chest:  normal symmetry        Cardiac: regular rhythm;normal S1 and S2;no murmurs, gallops or rubs " detected                  Abdomen:  BS normoactive        Extremities and Back:  no deformities, clubbing, cyanosis, erythema observed;no edema        Neurological:  no gross motor deficits;affect appropriate                      Past Medical History:   I have reviewed this patient's past medical history  Past Medical History:   Diagnosis Date     Asthma      Cervical high risk HPV (human papillomavirus) test positive 03/21/2019 03/21/19: See problem list.      Depression      Herpes genitalis      Human papilloma virus infection      Smoking              Past Surgical History:   I have reviewed this patient's past surgical history  Past Surgical History:   Procedure Laterality Date     COLPOSCOPY CERVIX, BIOPSY CERVIX, ENDOCERVICAL CURETTAGE, COMBINED                 Social History:   I have reviewed this patient's social history  Social History     Tobacco Use     Smoking status: Never Smoker     Smokeless tobacco: Never Used   Substance Use Topics     Alcohol use: Yes     Alcohol/week: 0.0 standard drinks     Comment: 3 to 3.5 glasses of wine a day              Family History:   I have reviewed this patient's family history  Family History   Problem Relation Age of Onset     Hypothyroidism Mother      Autoimmune Disease Mother      Hashimoto's thyroiditis Mother              Allergies:   No Known Allergies          Medications:   I have reviewed this patient's current medications  Current Outpatient Medications   Medication Sig Dispense Refill     valACYclovir (VALTREX) 1000 mg tablet Take 1 tablet (1,000 mg) by mouth 3 times daily (Patient not taking: Reported on 11/19/2021) 21 tablet 0               Review of Systems:       Review of Systems:  Skin:  Negative     Eyes:  not assessed    ENT:  not assessed    Respiratory:  Negative    Cardiovascular:  Negative    Gastroenterology: Negative    Genitourinary:  not assessed    Musculoskeletal:  Negative    Neurologic:  Negative    Psychiatric:  Positive for  anxiety;sleep disturbances  Heme/Lymph/Imm:  Negative    Endocrine:  Negative                       Data:   All available laboratory data reviewed  Lab Results   Component Value Date    CHOL 217 12/31/2020     Lab Results   Component Value Date    HDL 78 12/31/2020     Lab Results   Component Value Date     12/31/2020     Lab Results   Component Value Date    TRIG 60 12/31/2020     No results found for: CHOLHDLRATIO  TSH   Date Value Ref Range Status   12/31/2020 2.04 0.40 - 4.00 mU/L Final     Last Basic Metabolic Panel:  Lab Results   Component Value Date     01/06/2017      Lab Results   Component Value Date    POTASSIUM 3.7 01/06/2017     Lab Results   Component Value Date    CHLORIDE 105 01/06/2017     Lab Results   Component Value Date    EDWAR 8.9 01/06/2017     Lab Results   Component Value Date    CO2 25 01/06/2017     Lab Results   Component Value Date    BUN 14 01/06/2017     Lab Results   Component Value Date    CR 0.73 01/06/2017     Lab Results   Component Value Date    GLC 82 01/06/2017     Lab Results   Component Value Date    WBC 7.2 01/06/2017     Lab Results   Component Value Date    RBC 4.75 01/06/2017     Lab Results   Component Value Date    HGB 14.9 01/06/2017     Lab Results   Component Value Date    HCT 43.1 01/06/2017     Lab Results   Component Value Date    MCV 91 01/06/2017     Lab Results   Component Value Date    MCH 31.4 01/06/2017     Lab Results   Component Value Date    MCHC 34.6 01/06/2017     Lab Results   Component Value Date    RDW 11.9 01/06/2017     Lab Results   Component Value Date     01/06/2017

## 2021-11-19 NOTE — LETTER
"11/19/2021    Gisele De Leon Masters, DO  [address]  [facility] [address]    RE: Beth Bradley       Dear Colleague,    I had the pleasure of seeing Beth Bradley in the [facility].    Cardiology Consultation      Beth Bradley MRN# [id number]   YOB: 1985 Age: 36 year old   Date of Visit 11/19/2021     Reason for consult:  Family history of cardiomyopathy           Assessment and Plan:     1. Family history of cardiomyopathy, no echocardiographic or electrocardiographic abnormalities and no cardiovascular symptoms    Discussed that she is likely doing very well and we do not see any structural or electrical cardiac issues at this time.  If she develops any symptoms, she will let us know and we can revisit with further evaluation.    This note was transcribed using electronic voice recognition software, typographical errors may be present.                Chief Complaint:   Follow Up           History of Present Illness:   This patient is a very pleasant 36 year old female whose sister is Jie Marques and father is Trung Bradley.  Her father has had cardiomyopathy and congestive heart failure.  She wanted evaluation to make sure she was doing okay.  She denies any cardiovascular symptoms.  Her echocardiogram is normal.  Her ECG is normal.    No exertional chest pain  No PND / orthopnea  No presyncope / syncope  No significant palpitations           Physical Exam:     Vitals: /68 (BP Location: Right arm, Patient Position: Sitting, Cuff Size: Adult Regular)   Pulse 86   Ht 1.736 m (5' 8.34\")   Wt 74.9 kg (165 lb 3.2 oz)   SpO2 99%   BMI 24.87 kg/m    Constitutional:  cooperative, alert and oriented, well developed, well nourished, in no acute distress        Skin:  warm and dry to the touch, no apparent skin lesions or masses noted        Head:  normocephalic, no masses or lesions        Eyes:  pupils equal and round, " conjunctivae and lids unremarkable, sclera white, no xanthalasma, EOMS intact, no nystagmus        ENT:  no pallor or cyanosis        Neck:  JVP normal        Chest:  normal symmetry        Cardiac: regular rhythm;normal S1 and S2;no murmurs, gallops or rubs detected                  Abdomen:  BS normoactive        Extremities and Back:  no deformities, clubbing, cyanosis, erythema observed;no edema        Neurological:  no gross motor deficits;affect appropriate                      Past Medical History:   I have reviewed this patient's past medical history  Past Medical History:   Diagnosis Date     Asthma      Cervical high risk HPV (human papillomavirus) test positive 03/21/2019 03/21/19: See problem list.      Depression      Herpes genitalis      Human papilloma virus infection      Smoking              Past Surgical History:   I have reviewed this patient's past surgical history  Past Surgical History:   Procedure Laterality Date     COLPOSCOPY CERVIX, BIOPSY CERVIX, ENDOCERVICAL CURETTAGE, COMBINED                 Social History:   I have reviewed this patient's social history  Social History     Tobacco Use     Smoking status: Never Smoker     Smokeless tobacco: Never Used   Substance Use Topics     Alcohol use: Yes     Alcohol/week: 0.0 standard drinks     Comment: 3 to 3.5 glasses of wine a day              Family History:   I have reviewed this patient's family history  Family History   Problem Relation Age of Onset     Hypothyroidism Mother      Autoimmune Disease Mother      Hashimoto's thyroiditis Mother              Allergies:   No Known Allergies          Medications:   I have reviewed this patient's current medications  Current Outpatient Medications   Medication Sig Dispense Refill     valACYclovir (VALTREX) 1000 mg tablet Take 1 tablet (1,000 mg) by mouth 3 times daily (Patient not taking: Reported on 11/19/2021) 21 tablet 0               Review of Systems:       Review of Systems:  Skin:   Negative     Eyes:  not assessed    ENT:  not assessed    Respiratory:  Negative    Cardiovascular:  Negative    Gastroenterology: Negative    Genitourinary:  not assessed    Musculoskeletal:  Negative    Neurologic:  Negative    Psychiatric:  Positive for anxiety;sleep disturbances  Heme/Lymph/Imm:  Negative    Endocrine:  Negative                       Data:   All available laboratory data reviewed  Lab Results   Component Value Date    CHOL 217 12/31/2020     Lab Results   Component Value Date    HDL 78 12/31/2020     Lab Results   Component Value Date     12/31/2020     Lab Results   Component Value Date    TRIG 60 12/31/2020     No results found for: CHOLHDLRATIO  TSH   Date Value Ref Range Status   12/31/2020 2.04 0.40 - 4.00 mU/L Final     Last Basic Metabolic Panel:  Lab Results   Component Value Date     01/06/2017      Lab Results   Component Value Date    POTASSIUM 3.7 01/06/2017     Lab Results   Component Value Date    CHLORIDE 105 01/06/2017     Lab Results   Component Value Date    EDWAR 8.9 01/06/2017     Lab Results   Component Value Date    CO2 25 01/06/2017     Lab Results   Component Value Date    BUN 14 01/06/2017     Lab Results   Component Value Date    CR 0.73 01/06/2017     Lab Results   Component Value Date    GLC 82 01/06/2017     Lab Results   Component Value Date    WBC 7.2 01/06/2017     Lab Results   Component Value Date    RBC 4.75 01/06/2017     Lab Results   Component Value Date    HGB 14.9 01/06/2017     Lab Results   Component Value Date    HCT 43.1 01/06/2017     Lab Results   Component Value Date    MCV 91 01/06/2017     Lab Results   Component Value Date    MCH 31.4 01/06/2017     Lab Results   Component Value Date    MCHC 34.6 01/06/2017     Lab Results   Component Value Date    RDW 11.9 01/06/2017     Lab Results   Component Value Date     01/06/2017           Thank you for allowing me to participate in the care of your patient.      Sincerely,     Richard  Chucho Bobby MD     [facility]  cc:   No referring provider defined for this encounter.

## 2021-11-19 NOTE — LETTER
Date:February 2, 2022      Patient was self referred, no letter generated. Do not send.        [facility] Health Information

## 2021-12-15 ENCOUNTER — PATIENT OUTREACH (OUTPATIENT)
Dept: OBGYN | Facility: CLINIC | Age: 36
End: 2021-12-15
Payer: COMMERCIAL

## 2021-12-15 DIAGNOSIS — R87.810 CERVICAL HIGH RISK HPV (HUMAN PAPILLOMAVIRUS) TEST POSITIVE: ICD-10-CM

## 2021-12-15 NOTE — LETTER
December 15, 2021      Beth Bradley  [address] [facility] [address]        Dear ,    This letter is to remind you that you are due for your follow-up Pap smear and Human Papillomavirus (HPV) test.    Please call [phone] to schedule your appointment at your earliest convenience.    If you have completed the appointment outside of the [facility] system, please have the records forwarded to our office. We will update your chart for your provider to review before your next annual wellness visit.     Thank you for choosing [facility]!      Sincerely,    Your [facility] Care Team

## 2022-04-12 NOTE — PROGRESS NOTES
Beth is a 36 year old  female who presents for annual exam.     Besides routine health maintenance, she has no other health concerns today .    HPI:    Twin has hx lymphoma in 2006. Has recently had a lymph node biopsy.     Has been exercising more. Has lost 20lb. Is training for a half marathon.     Periods are long, heavy. Has cramping. Doesn't want to do anything about it        GYNECOLOGIC HISTORY:    Patient's last menstrual period was 2022 (approximate).    Regular menses? yes  Menses every 28 days.  Length of menses: 5 days    Her current contraception method is: vasectomy.  She  reports that she has never smoked. She has never used smokeless tobacco.  Patient is sexually active.  STD testing offered?  Declined  Last PHQ-9 score on record =   PHQ-9 SCORE 2022   PHQ-9 Total Score 11     Last GAD7 score on record =   MC-7 SCORE 2022   Total Score 17       HEALTH MAINTENANCE:  Cholesterol:   Recent Labs   Lab Test 20  1211 18  0903   CHOL 217* 172   HDL 78 83   * 75   TRIG 60 72     Last Mammo: N/A, Result: not applicable, Next Mammo: screen age 40    Pap:   Lab Results   Component Value Date    PAP NIL / HR HPV+ 2020    PAP NIL/ HPV 16+ 2019     Colonoscopy:  N/A, Result: not applicable, Next Colonoscopy: screen age 45-50  Dexa: N/A  Health maintenance updated:  yes    HISTORY:  OB History    Para Term  AB Living   0 0 0 0 0 0   SAB IAB Ectopic Multiple Live Births   0 0 0 0 0       Patient Active Problem List   Diagnosis     Cervical high risk HPV (human papillomavirus) test positive     Past Surgical History:   Procedure Laterality Date     COLPOSCOPY CERVIX, BIOPSY CERVIX, ENDOCERVICAL CURETTAGE, COMBINED        Social History     Tobacco Use     Smoking status: Never Smoker     Smokeless tobacco: Never Used   Substance Use Topics     Alcohol use: Yes     Alcohol/week: 0.0 standard drinks     Comment: 3 to 3.5 glasses of wine a day        "Problem (# of Occurrences) Relation (Name,Age of Onset)    Autoimmune Disease (1) Mother    Hashimoto's thyroiditis (1) Mother    Heart Failure (1) Father    Hodgkin's lymphoma (1) Sister: 2006    Hypothyroidism (1) Mother            Current Outpatient Medications   Medication Sig     valACYclovir (VALTREX) 1000 mg tablet Take 1 tablet (1,000 mg) by mouth 3 times daily (Patient not taking: No sig reported)     No current facility-administered medications for this visit.     No Known Allergies    Past medical, surgical, social and family histories were reviewed and updated in EPIC.    ROS:   12 point review of systems negative other than symptoms noted below or in the HPI.      EXAM:  /66   Ht 1.746 m (5' 8.75\")   Wt 68.9 kg (151 lb 12.8 oz)   LMP 03/25/2022 (Approximate)   Breastfeeding No   BMI 22.58 kg/m     BMI: Body mass index is 22.58 kg/m .    PHYSICAL EXAM:  Constitutional:   Appearance: Well nourished, well developed, alert, in no acute distress  Neck:  Lymph Nodes:  No lymphadenopathy present    Thyroid:  Gland size normal, nontender, no nodules or masses present  on palpation  Chest:  Respiratory Effort:  Breathing unlabored  Cardiovascular:    Heart: Auscultation:  Regular rate, normal rhythm, no murmurs present  Breasts: Inspection of Breasts:  No lymphadenopathy present., Palpation of Breasts and Axillae:  No masses present on palpation, no breast tenderness., Axillary Lymph Nodes:  No lymphadenopathy present. and No nodularity, asymmetry or nipple discharge bilaterally.  Gastrointestinal:   Abdominal Examination:  Abdomen nontender to palpation, tone normal without rigidity or guarding, no masses present, umbilicus without lesions   Liver and Spleen:  No hepatomegaly present, liver nontender to palpation    Hernias:  No hernias present  Lymphatic: Lymph Nodes:  No other lymphadenopathy present  Skin:  General Inspection:  No rashes present, no lesions present, no areas of "  discoloration  Neurologic:    Mental Status:  Oriented X3.  Normal strength and tone, sensory exam                grossly normal, mentation intact and speech normal.    Psychiatric:   Mentation appears normal and affect normal/bright.         Pelvic Exam:  External Genitalia:     Normal appearance for age, no discharge present, no tenderness present, no inflammatory lesions present, color normal  Vagina:     Normal vaginal vault without central or paravaginal defects, no discharge present, no inflammatory lesions present, no masses present  Bladder:     Nontender to palpation  Urethra:   Urethral Body:  Urethra palpation normal, urethra structural support normal   Urethral Meatus:  No erythema or lesions present  Cervix:     Appearance healthy, no lesions present, nontender to palpation, no bleeding present  Uterus:     Uterus: firm, normal sized and nontender, midplane in position.   Adnexa:     No adnexal tenderness present, no adnexal masses present  Perineum:     Perineum within normal limits, no evidence of trauma, no rashes or skin lesions present  Anus:     Anus within normal limits, no hemorrhoids present  Inguinal Lymph Nodes:     No lymphadenopathy present  Pubic Hair:     Normal pubic hair distribution for age  Genitalia and Groin:     No rashes present, no lesions present, no areas of discoloration, no masses present      COUNSELING:   Reviewed preventive health counseling, as reflected in patient instructions       Osteoporosis prevention/bone health    BMI: Body mass index is 22.58 kg/m .      ASSESSMENT:  36 year old female with satisfactory annual exam.    ICD-10-CM    1. Encounter for gynecological examination without abnormal finding  Z01.419    2. Screening for cervical cancer  Z12.4 Pap thin layer screen with HPV - recommended age 30 - 65 years   3. Screening for disorder of blood and blood-forming organs  Z13.0 CBC with platelets     CBC with platelets       PLAN:  -Pap/hpv obtained for  cervical cancer screening.  Fabricioe per guidelines.  -Breast self awareness discussed. Age 40 for mammogram.  -Colonoscopy age 45  -Osteoporosis prevention discussed.  -Will check CBC  -Rec she establish care with PCP or sister can ask her doctor about any screening recs that may apply to Beth regarding sister's prev dx of lymphoma  -Return one year for next annual exam          Gisele De Leon Masters, DO

## 2022-04-18 ENCOUNTER — OFFICE VISIT (OUTPATIENT)
Dept: OBGYN | Facility: CLINIC | Age: 37
End: 2022-04-18
Payer: COMMERCIAL

## 2022-04-18 VITALS
HEIGHT: 69 IN | DIASTOLIC BLOOD PRESSURE: 66 MMHG | SYSTOLIC BLOOD PRESSURE: 112 MMHG | BODY MASS INDEX: 22.48 KG/M2 | WEIGHT: 151.8 LBS

## 2022-04-18 DIAGNOSIS — Z13.0 SCREENING FOR DISORDER OF BLOOD AND BLOOD-FORMING ORGANS: ICD-10-CM

## 2022-04-18 DIAGNOSIS — Z01.419 ENCOUNTER FOR GYNECOLOGICAL EXAMINATION WITHOUT ABNORMAL FINDING: Primary | ICD-10-CM

## 2022-04-18 DIAGNOSIS — Z12.4 SCREENING FOR CERVICAL CANCER: ICD-10-CM

## 2022-04-18 LAB
ERYTHROCYTE [DISTWIDTH] IN BLOOD BY AUTOMATED COUNT: 12.5 % (ref 10–15)
HCT VFR BLD AUTO: 42.5 % (ref 35–47)
HGB BLD-MCNC: 13.9 G/DL (ref 11.7–15.7)
MCH RBC QN AUTO: 31.5 PG (ref 26.5–33)
MCHC RBC AUTO-ENTMCNC: 32.7 G/DL (ref 31.5–36.5)
MCV RBC AUTO: 96 FL (ref 78–100)
PLATELET # BLD AUTO: 188 10E3/UL (ref 150–450)
RBC # BLD AUTO: 4.41 10E6/UL (ref 3.8–5.2)
WBC # BLD AUTO: 5.9 10E3/UL (ref 4–11)

## 2022-04-18 PROCEDURE — 85027 COMPLETE CBC AUTOMATED: CPT | Performed by: OBSTETRICS & GYNECOLOGY

## 2022-04-18 PROCEDURE — 87624 HPV HI-RISK TYP POOLED RSLT: CPT | Performed by: OBSTETRICS & GYNECOLOGY

## 2022-04-18 PROCEDURE — 36415 COLL VENOUS BLD VENIPUNCTURE: CPT | Performed by: OBSTETRICS & GYNECOLOGY

## 2022-04-18 PROCEDURE — 99395 PREV VISIT EST AGE 18-39: CPT | Performed by: OBSTETRICS & GYNECOLOGY

## 2022-04-18 PROCEDURE — G0145 SCR C/V CYTO,THINLAYER,RESCR: HCPCS | Performed by: OBSTETRICS & GYNECOLOGY

## 2022-04-18 ASSESSMENT — PATIENT HEALTH QUESTIONNAIRE - PHQ9
SUM OF ALL RESPONSES TO PHQ QUESTIONS 1-9: 11
5. POOR APPETITE OR OVEREATING: NEARLY EVERY DAY

## 2022-04-18 ASSESSMENT — ANXIETY QUESTIONNAIRES
5. BEING SO RESTLESS THAT IT IS HARD TO SIT STILL: SEVERAL DAYS
IF YOU CHECKED OFF ANY PROBLEMS ON THIS QUESTIONNAIRE, HOW DIFFICULT HAVE THESE PROBLEMS MADE IT FOR YOU TO DO YOUR WORK, TAKE CARE OF THINGS AT HOME, OR GET ALONG WITH OTHER PEOPLE: VERY DIFFICULT
1. FEELING NERVOUS, ANXIOUS, OR ON EDGE: NEARLY EVERY DAY
3. WORRYING TOO MUCH ABOUT DIFFERENT THINGS: NEARLY EVERY DAY
2. NOT BEING ABLE TO STOP OR CONTROL WORRYING: MORE THAN HALF THE DAYS
7. FEELING AFRAID AS IF SOMETHING AWFUL MIGHT HAPPEN: MORE THAN HALF THE DAYS
6. BECOMING EASILY ANNOYED OR IRRITABLE: NEARLY EVERY DAY
GAD7 TOTAL SCORE: 17

## 2022-04-18 NOTE — PATIENT INSTRUCTIONS
-Daily total calcium intake (between food/supplements) should be 1000mg which equates to 3-4 servings calcium containing food per day; VItamin D 1000IU.   Foods rich in calcium are: milk, cheese, yogurt, seafood, sardines and canned salmon, leafy green vegetables such as delfino greens, spinach and kale, beans and lentils, almonds, seeds (poppy, sesame, celery, tamara), rhubarb, dried fruit such as figs, whey protein, tofu and edamame, amaranth, other foods with added calcium such as orange juice and some cereals.   If adequate amount not taken in diet, then a supplement may be needed.     -I also recommend increasing your dietary fiber by starting Metamucil (powder mixed in glass of water) once to twice daily

## 2022-04-19 ASSESSMENT — ANXIETY QUESTIONNAIRES: GAD7 TOTAL SCORE: 17

## 2022-04-20 LAB
BKR LAB AP GYN ADEQUACY: NORMAL
BKR LAB AP GYN INTERPRETATION: NORMAL
BKR LAB AP HPV REFLEX: NORMAL
BKR LAB AP LMP: NORMAL
BKR LAB AP PREVIOUS ABNL DX: NORMAL
BKR LAB AP PREVIOUS ABNORMAL: NORMAL
PATH REPORT.COMMENTS IMP SPEC: NORMAL
PATH REPORT.COMMENTS IMP SPEC: NORMAL
PATH REPORT.RELEVANT HX SPEC: NORMAL

## 2022-04-21 LAB
HUMAN PAPILLOMA VIRUS 16 DNA: NEGATIVE
HUMAN PAPILLOMA VIRUS 18 DNA: NEGATIVE
HUMAN PAPILLOMA VIRUS FINAL DIAGNOSIS: NORMAL
HUMAN PAPILLOMA VIRUS OTHER HR: NEGATIVE

## 2022-06-07 ENCOUNTER — TRANSFERRED RECORDS (OUTPATIENT)
Dept: HEALTH INFORMATION MANAGEMENT | Facility: CLINIC | Age: 37
End: 2022-06-07
Payer: COMMERCIAL

## 2022-09-09 ENCOUNTER — TRANSFERRED RECORDS (OUTPATIENT)
Dept: HEALTH INFORMATION MANAGEMENT | Facility: CLINIC | Age: 37
End: 2022-09-09

## 2023-07-17 ENCOUNTER — HOSPITAL ENCOUNTER (EMERGENCY)
Facility: CLINIC | Age: 38
Discharge: HOME | End: 2023-07-17
Attending: EMERGENCY MEDICINE | Admitting: EMERGENCY MEDICINE
Payer: COMMERCIAL

## 2023-07-17 ENCOUNTER — APPOINTMENT (OUTPATIENT)
Dept: CT IMAGING | Facility: CLINIC | Age: 38
End: 2023-07-17
Attending: EMERGENCY MEDICINE
Payer: COMMERCIAL

## 2023-07-17 ENCOUNTER — NURSE TRIAGE (OUTPATIENT)
Dept: NURSING | Facility: CLINIC | Age: 38
End: 2023-07-17

## 2023-07-17 VITALS
TEMPERATURE: 98 F | RESPIRATION RATE: 16 BRPM | BODY MASS INDEX: 22.58 KG/M2 | DIASTOLIC BLOOD PRESSURE: 82 MMHG | OXYGEN SATURATION: 98 % | WEIGHT: 149 LBS | HEIGHT: 68 IN | SYSTOLIC BLOOD PRESSURE: 124 MMHG | HEART RATE: 88 BPM

## 2023-07-17 DIAGNOSIS — M89.9 BONE LESION: ICD-10-CM

## 2023-07-17 DIAGNOSIS — R10.31 RIGHT LOWER QUADRANT PAIN: ICD-10-CM

## 2023-07-17 LAB
ALBUMIN UR-MCNC: NEGATIVE MG/DL
ANION GAP SERPL CALCULATED.3IONS-SCNC: 9 MMOL/L (ref 7–15)
APPEARANCE UR: CLEAR
BACTERIA #/AREA URNS HPF: ABNORMAL /HPF
BASOPHILS # BLD AUTO: 0.1 10E3/UL (ref 0–0.2)
BASOPHILS NFR BLD AUTO: 1 %
BILIRUB UR QL STRIP: NEGATIVE
BUN SERPL-MCNC: 8.5 MG/DL (ref 6–20)
CALCIUM SERPL-MCNC: 9.6 MG/DL (ref 8.6–10)
CHLORIDE SERPL-SCNC: 103 MMOL/L (ref 98–107)
COLOR UR AUTO: ABNORMAL
CREAT SERPL-MCNC: 0.54 MG/DL (ref 0.51–0.95)
DEPRECATED HCO3 PLAS-SCNC: 26 MMOL/L (ref 22–29)
EOSINOPHIL # BLD AUTO: 0.2 10E3/UL (ref 0–0.7)
EOSINOPHIL NFR BLD AUTO: 3 %
ERYTHROCYTE [DISTWIDTH] IN BLOOD BY AUTOMATED COUNT: 12.2 % (ref 10–15)
GFR SERPL CREATININE-BSD FRML MDRD: >90 ML/MIN/1.73M2
GLUCOSE SERPL-MCNC: 92 MG/DL (ref 70–99)
GLUCOSE UR STRIP-MCNC: NEGATIVE MG/DL
HCG SERPL QL: NEGATIVE
HCT VFR BLD AUTO: 43.7 % (ref 35–47)
HGB BLD-MCNC: 14.9 G/DL (ref 11.7–15.7)
HGB UR QL STRIP: NEGATIVE
IMM GRANULOCYTES # BLD: 0 10E3/UL
IMM GRANULOCYTES NFR BLD: 1 %
KETONES UR STRIP-MCNC: NEGATIVE MG/DL
LEUKOCYTE ESTERASE UR QL STRIP: NEGATIVE
LYMPHOCYTES # BLD AUTO: 2 10E3/UL (ref 0.8–5.3)
LYMPHOCYTES NFR BLD AUTO: 37 %
MCH RBC QN AUTO: 32.7 PG (ref 26.5–33)
MCHC RBC AUTO-ENTMCNC: 34.1 G/DL (ref 31.5–36.5)
MCV RBC AUTO: 96 FL (ref 78–100)
MONOCYTES # BLD AUTO: 0.4 10E3/UL (ref 0–1.3)
MONOCYTES NFR BLD AUTO: 8 %
NEUTROPHILS # BLD AUTO: 2.8 10E3/UL (ref 1.6–8.3)
NEUTROPHILS NFR BLD AUTO: 50 %
NITRATE UR QL: NEGATIVE
NRBC # BLD AUTO: 0 10E3/UL
NRBC BLD AUTO-RTO: 0 /100
PH UR STRIP: 6 [PH] (ref 5–7)
PLATELET # BLD AUTO: 207 10E3/UL (ref 150–450)
POTASSIUM SERPL-SCNC: 3.8 MMOL/L (ref 3.4–5.3)
RBC # BLD AUTO: 4.55 10E6/UL (ref 3.8–5.2)
RBC URINE: <1 /HPF
SODIUM SERPL-SCNC: 138 MMOL/L (ref 136–145)
SP GR UR STRIP: 1 (ref 1–1.03)
SQUAMOUS EPITHELIAL: <1 /HPF
UROBILINOGEN UR STRIP-MCNC: NORMAL MG/DL
WBC # BLD AUTO: 5.5 10E3/UL (ref 4–11)
WBC URINE: 1 /HPF

## 2023-07-17 PROCEDURE — 250N000011 HC RX IP 250 OP 636: Performed by: EMERGENCY MEDICINE

## 2023-07-17 PROCEDURE — 81001 URINALYSIS AUTO W/SCOPE: CPT | Performed by: EMERGENCY MEDICINE

## 2023-07-17 PROCEDURE — 74177 CT ABD & PELVIS W/CONTRAST: CPT

## 2023-07-17 PROCEDURE — 82947 ASSAY GLUCOSE BLOOD QUANT: CPT | Performed by: EMERGENCY MEDICINE

## 2023-07-17 PROCEDURE — 36415 COLL VENOUS BLD VENIPUNCTURE: CPT | Performed by: EMERGENCY MEDICINE

## 2023-07-17 PROCEDURE — 85025 COMPLETE CBC W/AUTO DIFF WBC: CPT | Performed by: EMERGENCY MEDICINE

## 2023-07-17 PROCEDURE — 99285 EMERGENCY DEPT VISIT HI MDM: CPT | Mod: 25

## 2023-07-17 PROCEDURE — 250N000009 HC RX 250: Performed by: EMERGENCY MEDICINE

## 2023-07-17 PROCEDURE — 84703 CHORIONIC GONADOTROPIN ASSAY: CPT | Performed by: EMERGENCY MEDICINE

## 2023-07-17 RX ORDER — IOPAMIDOL 755 MG/ML
500 INJECTION, SOLUTION INTRAVASCULAR ONCE
Status: COMPLETED | OUTPATIENT
Start: 2023-07-17 | End: 2023-07-17

## 2023-07-17 RX ADMIN — SODIUM CHLORIDE 59 ML: 9 INJECTION, SOLUTION INTRAVENOUS at 15:33

## 2023-07-17 RX ADMIN — IOPAMIDOL 75 ML: 755 INJECTION, SOLUTION INTRAVENOUS at 15:33

## 2023-07-17 ASSESSMENT — ACTIVITIES OF DAILY LIVING (ADL)
ADLS_ACUITY_SCORE: 33
ADLS_ACUITY_SCORE: 35
ADLS_ACUITY_SCORE: 35

## 2023-07-17 NOTE — ED TRIAGE NOTES
"  Pt c/o intermittent RLQ pain for \"months\", since Wednesday the pain has occurred more frequently. Denies nausea, had diarrhea Sunday. Called the nurse line and was advised to come to the ED.      Triage Assessment     Row Name 07/17/23 1146       Triage Assessment (Adult)    Airway WDL WDL       Respiratory WDL    Respiratory WDL WDL       Skin Circulation/Temperature WDL    Skin Circulation/Temperature WDL WDL       Cardiac WDL    Cardiac WDL WDL       Peripheral/Neurovascular WDL    Peripheral Neurovascular WDL WDL       Cognitive/Neuro/Behavioral WDL    Cognitive/Neuro/Behavioral WDL WDL              "

## 2023-07-17 NOTE — ED NOTES
Rapid Assessment Note    History:   Beth Bradley is a 38 year old female reportedly healthy at baseline who presents with lower quadrant abdominal pain that was intermittent initially over 1 month and then became more consistent recently.  She describes this occasionally pulsatile and occasionally achy.  It has been more persistent over the last 48 hours.  She denies any fever or chills.  She denies any nausea or vomiting.  She had diarrhea over the weekend but she attributes this to food as other people who ate the same food had it.  She otherwise has not had any stool changes.  She does not intermittently cloudy urine but denies dysuria or hematuria.  She denies any abnormal vaginal bleeding or discharge.  Pain at times can become severe.  Currently she notes some mild tenderness.    Exam:   General:  Alert, interactive  Cardiovascular:  Well perfused  Lungs:  No respiratory distress, no accessory muscle use  Abdomen: Soft and nondistended.  Mild right lower quadrant tenderness to palpation just below the level of the umbilicus.  No palpable mass.  No rebound or guarding.  Neuro:  Moving all 4 extremities  Skin:  Warm, dry  Psych:  Normal affect      Plan of Care:   I evaluated the patient and developed an initial plan of care. I discussed this plan and explained that I, or one of my partners, would be returning to complete the evaluation.     Due to hospital and departmental capacity constraints and prolonged wait times, this patient was evaluated in non-traditional circumstances such as in triage/waiting room, a hallway, etc. I explained the option to wait for a traditional treatment space and apologized for the inconvenience. Given the circumstances, every attempt was made to provide for the patient's comfort and privacy and to perform the most thorough evaluation possible.      7/17/2023  [employer]    Portions of this medical record were completed by a scribe. UPON MY  REVIEW AND AUTHENTICATION BY ELECTRONIC SIGNATURE, this confirms (a) I performed the applicable clinical services, and (b) the record is accurate.      Fidelia Gonsalez MD  07/17/23 9414

## 2023-07-17 NOTE — TELEPHONE ENCOUNTER
Pt is phoning stating that she is having lower right abdominal pain - sometimes pain will radiate to other side     Pt states that her pain has been going on for a while now but is becoming more frequent and consistent     Rates pain  7/10    No fever     Pt had diarrhea on Sunday 07/16/2023 which has since stopped     Per disposition: Go To ED/UCC Now (Or To Office With PCP Approval)    Pt will be going to ED now     Care advice given per protocol and when to call back. Pt verbalized understanding and agrees to plan of care.    Esha Nails RN  [place] Nurse Advisor  9:13 AM 7/17/2023                Reason for Disposition    Constant abdominal pain lasting > 2 hours    Additional Information    Negative: Passed out (i.e., fainted, collapsed and was not responding)    Negative: Shock suspected (e.g., cold/pale/clammy skin, too weak to stand, low BP, rapid pulse)    Negative: Sounds like a life-threatening emergency to the triager    Negative: Chest pain    Negative: Pain is mainly in upper abdomen (if needed ask: 'is it mainly above the belly button?')    Negative: Abdominal pain and pregnant < 20 weeks    Negative: Abdominal pain and pregnant 20 or more weeks    Negative: SEVERE abdominal pain (e.g., excruciating)    Negative: Vomiting red blood or black (coffee ground) material    Negative: Bloody, black, or tarry bowel movements  (Exception: Chronic-unchanged black-grey bowel movements and is taking iron pills or Pepto-Bismol.)    Protocols used: ABDOMINAL PAIN - FEMALE-A-OH

## 2023-07-17 NOTE — ED PROVIDER NOTES
"  History     Chief Complaint:  Abdominal Pain     The history is provided by the patient.      Beth Bradley is a 38 year old female who presents for evaluation of abdominal pain. The patient reports she has been having episodes of right lower quadrant abdominal pain for 8 months that have increased in frequency recently. States that the pain wound come around the her time of ovulation, but now comes at any time. Adds that the pain is dull and can last anywhere from 1-2 hours. Endorses some leg cramps and changes to the appearance of her urine. Notes that her last menstrual period began on 7/4/23, normal for her. Denies dysuria, hematuria, nausea, vomiting, and diarrhea. Denies history of abdominal surgery.    Independent Historian:   None - Patient Only    Review of External Notes:   Patient reviewed electronic records, I see that she called the nurse line at 854 this morning and was told to come to the emergency department due to her right lower quadrant pain.  I also reviewed the prescription monitoring program, no prior opioids.    Medications:    The patient is currently on no regular medications.    Past Medical History:    Asthma  Depression  Herpes genitalis  HPV infection    Past Surgical History:    Cervical biopsy      Physical Exam     Patient Vitals for the past 24 hrs:   BP Temp Temp src Pulse Resp SpO2 Height Weight   07/17/23 1719 124/82 98  F (36.7  C) Oral 88 16 98 % -- --   07/17/23 1515 131/87 -- -- 60 -- 100 % -- --   07/17/23 1500 125/84 -- -- 54 -- 100 % -- --   07/17/23 1400 131/82 -- -- 58 -- 100 % -- --   07/17/23 1350 132/83 98  F (36.7  C) Oral 58 18 100 % -- --   07/17/23 1150 (!) 132/90 -- -- -- -- -- -- --   07/17/23 1148 -- 96.8  F (36  C) Temporal 70 20 100 % 1.727 m (5' 8\") 67.6 kg (149 lb)      Physical Exam   General: Woman sitting upright in room 11, nontoxic in appearance  HENT: mucous membranes moist   CV: rate as above, no murmur audible, no lower extremity edema  Resp: " normal effort, speaks in full phrases, no stridor, no cough observed  GI: Abdomen soft, very minimal tenderness in lower abdomen slightly greater on the right side but not focally over McBurney's, no guarding, no distention, no discrete masses palpable  MSK: no bony tenderness, no CVAT  Skin: appropriately warm and dry  Neuro: alert, clear speech, oriented   Psych: cooperative, pleasant    Emergency Department Course   Imaging:  CT Abdomen Pelvis w Contrast   Final Result   IMPRESSION:    1.  No acute findings in the abdomen or pelvis to explain the   patient's symptoms. No inflammatory process, bowel obstruction or   hydronephrosis.   2.  Nonvisualized appendix. No secondary signs of appendicitis.   3.  Trace pelvic free fluid is likely physiologic.   4.  Indeterminate but likely benign 9 mm sclerotic lesion in the   posterior right iliac bone. Recommend 3-6 month noncontrast CT pelvis   follow-up.      DAREN JOHNSON MD            SYSTEM ID:  [id number]         Report per radiology    Laboratory:  Labs Ordered and Resulted from Time of ED Arrival to Time of ED Departure   ROUTINE UA WITH MICROSCOPIC REFLEX TO CULTURE - Abnormal       Result Value    Color Urine Straw      Appearance Urine Clear      Glucose Urine Negative      Bilirubin Urine Negative      Ketones Urine Negative      Specific Gravity Urine 1.005      Blood Urine Negative      pH Urine 6.0      Protein Albumin Urine Negative      Urobilinogen Urine Normal      Nitrite Urine Negative      Leukocyte Esterase Urine Negative      Bacteria Urine Moderate (*)     RBC Urine <1      WBC Urine 1      Squamous Epithelials Urine <1     BASIC METABOLIC PANEL - Normal    Sodium 138      Potassium 3.8      Chloride 103      Carbon Dioxide (CO2) 26      Anion Gap 9      Urea Nitrogen 8.5      Creatinine 0.54      Calcium 9.6      Glucose 92      GFR Estimate >90     HCG QUALITATIVE PREGNANCY - Normal    hCG Serum Qualitative Negative     CBC WITH PLATELETS AND  DIFFERENTIAL    WBC Count 5.5      RBC Count 4.55      Hemoglobin 14.9      Hematocrit 43.7      MCV 96      MCH 32.7      MCHC 34.1      RDW 12.2      Platelet Count 207      % Neutrophils 50      % Lymphocytes 37      % Monocytes 8      % Eosinophils 3      % Basophils 1      % Immature Granulocytes 1      NRBCs per 100 WBC 0      Absolute Neutrophils 2.8      Absolute Lymphocytes 2.0      Absolute Monocytes 0.4      Absolute Eosinophils 0.2      Absolute Basophils 0.1      Absolute Immature Granulocytes 0.0      Absolute NRBCs 0.0       Emergency Department Course & Assessments:     Interventions:  Medications   iopamidol (ISOVUE-370) solution 500 mL (75 mLs Intravenous $Given 7/17/23 1533)   CT scan flush (59 mLs Intravenous $Given 7/17/23 1533)      Assessments:  1523 I obtained history and examined the patient as noted above.   1625 I rechecked and updated the patient.   1711 I rechecked and updated the patient.     Independent Interpretation (X-rays, CTs, rhythm strip):  I reviewed the CT scan images and see abnormality in the right iliac bone, no evidence of bowel obstruction    Consultations/Discussion of Management or Tests:  None        Social Determinants of Health affecting care:   Access to care    Disposition:  The patient was discharged to home.     Impression & Plan    Medical Decision Making:  The cause of her longstanding and intermittent right lower quadrant pain is unclear despite testing as above.  She was sent here out of concern for possible appendicitis though her clinical presentation and exam are not particularly suggestive of this, and CT shows no evidence of inflammation in that region either.  She has no peritonitis and is tolerating oral intake with excellent vitals and basic laboratory studies.  She is not pregnant, ruling out ectopic.  No CT findings of adnexal mass to raise higher concern for ovarian torsion.  No signs of ureteral stone.  Urinalysis does not support urinary tract  infection or pyelonephritis.  Acknowledged the diagnostic uncertainty with the patient, along with reassurances of today's evaluation.  We discussed the CT finding of the iliac bone sclerotic lesion, I do think this is likely unrelated to her presenting symptoms though does warrant follow-up and this was specifically reviewed with her.  She agrees with the plan for discharge and outpatient follow-up, but should return here for sudden worsening at any hour.    Diagnosis:    ICD-10-CM    1. Right lower quadrant pain  R10.31       2. Bone lesion  M89.9     R iliac, suspect incidental finding          Scribe Disclosure:  I, Katya Marrero, am serving as a scribe at 3:02 PM on 7/17/2023 to document services personally performed by Zaheer Glaser MD based on my observations and the provider's statements to me.     7/17/2023   Zaheer Glaser MD Reitsema, Jeffrey Alan, MD  07/18/23 1148

## 2023-07-17 NOTE — LETTER
July 17, 2023      To Whom It May Concern:      Beth Bradley was seen in our Emergency Department today, 07/17/23.  I expect her condition to improve over the next *** days.  She may return to work/school when improved.    Sincerely,        Agnieszka Pavon RN

## 2023-07-17 NOTE — DISCHARGE INSTRUCTIONS
Discharge Instructions  Abdominal Pain    The Radiologist recommends a follow up CT of your iliac bone in 3-6 months for further evaluation of the bony lesion seen on your CT today, which is likely unrelated to your symptoms.    Abdominal pain (belly pain) can be caused by many things. Your evaluation today does not show the exact cause for your pain. Your provider today has decided that it is unlikely your pain is due to a life threatening problem, or a problem requiring surgery or hospital admission. Sometimes those problems cannot be found right away, so it is very important that you follow up as directed.  Sometimes only the changes which occur over time allow the cause of your pain to be found.    Generally, every Emergency Department visit should have a follow-up clinic visit with either a primary or a specialty clinic/provider. Please follow-up as instructed by your emergency provider today. With abdominal pain, we often recommend very close follow-up, such as the following day.    ADULTS:  Return to the Emergency Department right away if:    You get an oral temperature above 102oF or as directed by your provider.  You have blood in your stools. This may be bright red or appear as black, tarry stools.    You keep vomiting (throwing up) or cannot drink liquids.  You see blood when you vomit.   You cannot have a bowel movement or you cannot pass gas.  Your stomach gets bloated or bigger.  Your skin or the whites of your eyes look yellow.  You faint.  You have bloody, frequent or painful urination (peeing).  You have new symptoms or anything that worries you.    CHILDREN:  Return to the Emergency Department right away if your child has any of the above-listed symptoms or the following:    Pushes your hand away or screams/cries when his/her belly is touched.  You notice your child is very fussy or weak.  Your child is very tired and is too tired to eat or drink.  Your child is dehydrated.  Signs of dehydration  can be:  Significant change in the amount of wet diapers/urine.  Your infant or child starts to have dry mouth and lips, or no saliva (spit) or tears.    PREGNANT WOMEN:  Return to the Emergency Department right away if you have any of the above-listed symptoms or the following:    You have bleeding, leaking fluid or passing tissue from the vagina.  You have worse pain or cramping, or pain in your shoulder or back.  You have vomiting that will not stop.  You have a temperature of 100oF or more.  Your baby is not moving as much as usual.  You faint.  You get a bad headache with or without eye problems and abdominal pain.  You have a seizure.  You have unusual discharge from your vagina and abdominal pain.    Abdominal pain is pretty common during pregnancy.  Your pain may or may not be related to your pregnancy. You should follow-up closely with your OB provider so they can evaluate you and your baby.  Until you follow-up with your regular provider, do the following:     Avoid sex and do not put anything in your vagina.  Drink clear fluids.  Only take medications approved by your provider.    MORE INFORMATION:    Appendicitis:  A possible cause of abdominal pain in any person who still has their appendix is acute appendicitis. Appendicitis is often hard to diagnose.  Testing does not always rule out early appendicitis or other causes of abdominal pain. Close follow-up with your provider and re-evaluations may be needed to figure out the reason for your abdominal pain.    Follow-up:  It is very important that you make an appointment with your clinic and go to the appointment.  If you do not follow-up with your primary provider, it may result in missing an important development which could result in permanent injury or disability and/or lasting pain.  If there is any problem keeping your appointment, call your provider or return to the Emergency Department.    Medications:  Take your medications as directed by your  "provider today.  Before using over-the-counter medications, ask your provider and make sure to take the medications as directed.  If you have any questions about medications, ask your provider.    Diet:  Resume your normal diet as much as possible, but do not eat fried, fatty or spicy foods while you have pain.  Do not drink alcohol or have caffeine.  Do not smoke tobacco.    Probiotics: If you have been given an antibiotic, you may want to also take a probiotic pill or eat yogurt with live cultures. Probiotics have \"good bacteria\" to help your intestines stay healthy. Studies have shown that probiotics help prevent diarrhea (loose stools) and other intestine problems (including C. diff infection) when you take antibiotics. You can buy these without a prescription in the pharmacy section of the store.     If you were given a prescription for medicine here today, be sure to read all of the information (including the package insert) that comes with your prescription.  This will include important information about the medicine, its side effects, and any warnings that you need to know about.  The pharmacist who fills the prescription can provide more information and answer questions you may have about the medicine.  If you have questions or concerns that the pharmacist cannot address, please call or return to the Emergency Department.       Remember that you can always come back to the Emergency Department if you are not able to see your regular provider in the amount of time listed above, if you get any new symptoms, or if there is anything that worries you.    "

## 2023-07-18 ENCOUNTER — TELEPHONE (OUTPATIENT)
Dept: PEDIATRICS | Facility: CLINIC | Age: 38
End: 2023-07-18
Payer: COMMERCIAL

## 2023-07-18 NOTE — TELEPHONE ENCOUNTER
Reason for Call:  Appointment Request    Patient requesting this type of appt:  Hospital/ED Follow-Up     Requested provider:  any provider    Reason patient unable to be scheduled: Not within requested timeframe    When does patient want to be seen/preferred time: Same day    Comments: Patient was seen at [facility] on 07/17/2023 and would like to be seen today for a follow up    Okay to leave a detailed message?: Yes at Cell number on file:    Telephone Information:   Mobile [phone]       Call taken on 7/18/2023 at 9:41 AM by Michelle Valero

## 2023-07-26 ENCOUNTER — OFFICE VISIT (OUTPATIENT)
Dept: PEDIATRICS | Facility: CLINIC | Age: 38
End: 2023-07-26
Payer: COMMERCIAL

## 2023-07-26 VITALS
DIASTOLIC BLOOD PRESSURE: 68 MMHG | OXYGEN SATURATION: 99 % | TEMPERATURE: 98.7 F | SYSTOLIC BLOOD PRESSURE: 98 MMHG | HEIGHT: 68 IN | HEART RATE: 64 BPM | BODY MASS INDEX: 22.84 KG/M2 | WEIGHT: 150.7 LBS

## 2023-07-26 DIAGNOSIS — R10.31 RLQ ABDOMINAL PAIN: Primary | ICD-10-CM

## 2023-07-26 PROCEDURE — 99214 OFFICE O/P EST MOD 30 MIN: CPT | Mod: GC

## 2023-07-26 ASSESSMENT — PAIN SCALES - GENERAL: PAINLEVEL: NO PAIN (0)

## 2023-07-26 NOTE — PROGRESS NOTES
Assessment & Plan     RLQ abdominal pain  Reviewed CT abd/pelvis, likely not appendicitis or diverticulitis as patient is without fever and lack of leukocytosis on ED labs and pain sounds to be improving since her ED visit. Pain likely secondary to ovarian follicle versus ovarian cyst, she is scheduled for pelvic US on 8/7/23. Will plan to follow up these results and proceed with further workup as needed.       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 Will likely repeat non contrast pelvic CT in 3-6 months for further evaluation.     Plan to follow up in 1-2 months for annual wellness visit, will discuss routine labs (HCV, lipids, LFTs, etc), repeat imaging for lesion, etc.         Jazmyn Escobedo MD  [facility]      I have seen this patient and I was present during key portions of the procedure/exam and immediately available to furnish services during the entire visit. I have reviewed the documentation and discussed the clinical decision making with Dr. Escobedo. Her progress note reflects our joint assessment and plan.     Zaheer Matson M.D.  Internal Medicine-Pediatrics      Porsche Campos is a 38 year old, presenting for the following health issues:  ER F/U (Patient is here for an ER follow up visit from 7/17/23. She also had a follow up done at [facility] [place] which was done on 7/20/23. She is having an pelvic ultrasound on 8/7/23.)        7/26/2023     1:51 PM   Additional Questions   Roomed by Halina   Accompanied by self         7/26/2023     1:51 PM   Patient Reported Additional Medications   Patient reports taking the following new medications none       Opal Bradley is a 38-year-old healthy female who presents to the clinic for evaluation of right lower quadrant pain following an ED visit on 7/17/2023.  Patient reports that she has been having right lower quadrant pain with menstrual cycles for many months  however over the last 3 to 4 months she has started having right lower quadrant pain throughout the month, no longer associated with menstrual cycle.  Patient reports that the pain stays in the right lower quadrant pain, no radiation to back.  She does have associated leg cramping, at the same time as the abdominal pain.  Is unsure which one starts first.  She states that when she was seen in the ED the pain had been ongoing for 5 or 6 days, she was unable to sleep at night therefore presented to the ED on recommendation of triage nurse due to concerns for appendicitis. Patient reports that the pain has started to improve and become less frequent since her ED visit. Patient scheduled for pelvic ultrasound 8/7/2023.    Her last menstrual cycle was 7/4/2023, patient reports that she is not pregnant.  She had a negative pregnancy test at the ER. Reports occasional diarrhea and recently had some stools that were lighter in color than typical (light brown, no gray or white stools).  Patient reports that she drinks lots of water and therefore has increased frequency of urination, wakes up 2-3 times a night to go to the bathroom.  Patient denies any other urinary symptoms.  No dysuria no hematuria. No nausea or vomiting.  No hematochezia.     Past medical history: Patient reports that she has a history of anxiety, does not take any medications for this.  Reports that she has managing her anxiety well at this time and is not interested in pursuing medications at this time.  Otherwise has been overall healthy.  She did have a sclerotic bone lesion in the hepatic lesion likely a cyst noted on CT scan in the ED on 7/17, that will need followed up.     Family history: notable for CHF in father, patient has had an echo and EKG for evaluation.  Twin sister, fraternal: History of Hodgkin's lymphoma.  Maternal grandma with breast cancer, diagnosed in her 70s.  No history of any other gynecological cancers  No history of  "diabetes.    Social: Patient recently  this year. Reports alcohol use, 3-4 beverages per weekday and 5 beverages on the weekend x several years, peak from [id number].  Patient reports that she is cutting back and has recently only been having 1-2 drinks a couple times a week.       ED/UC Followup:    Facility:  [facility]  Date of visit: 7/17/23  Reason for visit: abdominal pain  Current Status: Patient reports that she is doing better. She still is having some intermittent pain that is occurring.    Review of Systems   CONSTITUTIONAL:NEGATIVE for fever, chills, change in weight  RESP:NEGATIVE for significant cough or SOB  CV: NEGATIVE for chest pain, palpitations or peripheral edema  GI: NEGATIVE for nausea, change in bowel habits. + Abdominal pain, RLQ.   :abnormal menstrual cycles, heavy periods, passing clots.   MUSCULOSKELETAL: NEGATIVE for significant arthralgias or myalgia. +Cramping in bilateral legs, occasionally  NEURO: NEGATIVE for weakness, dizziness or paresthesias  PSYCHIATRIC: Occasionally anxious, well managed at time.       Objective    BP 98/68 (BP Location: Right arm, Patient Position: Sitting, Cuff Size: Adult Regular)   Pulse 64   Temp 98.7  F (37.1  C) (Tympanic)   Ht 1.727 m (5' 8\")   Wt 68.4 kg (150 lb 11.2 oz)   LMP 07/04/2023 (Approximate)   SpO2 99%   BMI 22.91 kg/m    Body mass index is 22.91 kg/m .  Physical Exam   GENERAL: healthy, alert and no distress  RESP: lungs clear to auscultation - no rales, rhonchi or wheezes  CV: regular rate and rhythm, normal S1 S2, no S3 or S4, no murmur, click or rub, no peripheral edema and peripheral pulses strong  ABDOMEN: soft, nontender, bowel sounds normal  MS: no gross musculoskeletal defects noted, no edema    Admission on 07/17/2023, Discharged on 07/17/2023   Component Date Value Ref Range Status    Sodium 07/17/2023 138  136 - 145 mmol/L Final    Potassium 07/17/2023 3.8  3.4 - 5.3 mmol/L Final    Chloride 07/17/2023 " 103  98 - 107 mmol/L Final    Carbon Dioxide (CO2) 07/17/2023 26  22 - 29 mmol/L Final    Anion Gap 07/17/2023 9  7 - 15 mmol/L Final    Urea Nitrogen 07/17/2023 8.5  6.0 - 20.0 mg/dL Final    Creatinine 07/17/2023 0.54  0.51 - 0.95 mg/dL Final    Calcium 07/17/2023 9.6  8.6 - 10.0 mg/dL Final    Glucose 07/17/2023 92  70 - 99 mg/dL Final    GFR Estimate 07/17/2023 >90  >60 mL/min/1.73m2 Final    Color Urine 07/17/2023 Straw  Colorless, Straw, Light Yellow, Yellow Final    Appearance Urine 07/17/2023 Clear  Clear Final    Glucose Urine 07/17/2023 Negative  Negative mg/dL Final    Bilirubin Urine 07/17/2023 Negative  Negative Final    Ketones Urine 07/17/2023 Negative  Negative mg/dL Final    Specific Gravity Urine 07/17/2023 1.005  1.003 - 1.035 Final    Blood Urine 07/17/2023 Negative  Negative Final    pH Urine 07/17/2023 6.0  5.0 - 7.0 Final    Protein Albumin Urine 07/17/2023 Negative  Negative mg/dL Final    Urobilinogen Urine 07/17/2023 Normal  Normal, 2.0 mg/dL Final    Nitrite Urine 07/17/2023 Negative  Negative Final    Leukocyte Esterase Urine 07/17/2023 Negative  Negative Final    Bacteria Urine 07/17/2023 Moderate (A)  None Seen /HPF Final    RBC Urine 07/17/2023 <1  <=2 /HPF Final    WBC Urine 07/17/2023 1  <=5 /HPF Final    Squamous Epithelials Urine 07/17/2023 <1  <=1 /HPF Final    hCG Serum Qualitative 07/17/2023 Negative  Negative Final    This test is for screening purposes.  Results should be interpreted along with the clinical picture.  Confirmation testing is available if warranted by ordering [id number], HCG Quantitative Pregnancy.    WBC Count 07/17/2023 5.5  4.0 - 11.0 10e3/uL Final    RBC Count 07/17/2023 4.55  3.80 - 5.20 10e6/uL Final    Hemoglobin 07/17/2023 14.9  11.7 - 15.7 g/dL Final    Hematocrit 07/17/2023 43.7  35.0 - 47.0 % Final    MCV 07/17/2023 96  78 - 100 fL Final    MCH 07/17/2023 32.7  26.5 - 33.0 pg Final    MCHC 07/17/2023 34.1  31.5 - 36.5 g/dL Final    RDW 07/17/2023 12.2   10.0 - 15.0 % Final    Platelet Count 07/17/2023 207  150 - 450 10e3/uL Final    % Neutrophils 07/17/2023 50  % Final    % Lymphocytes 07/17/2023 37  % Final    % Monocytes 07/17/2023 8  % Final    % Eosinophils 07/17/2023 3  % Final    % Basophils 07/17/2023 1  % Final    % Immature Granulocytes 07/17/2023 1  % Final    NRBCs per 100 WBC 07/17/2023 0  <1 /100 Final    Absolute Neutrophils 07/17/2023 2.8  1.6 - 8.3 10e3/uL Final    Absolute Lymphocytes 07/17/2023 2.0  0.8 - 5.3 10e3/uL Final    Absolute Monocytes 07/17/2023 0.4  0.0 - 1.3 10e3/uL Final    Absolute Eosinophils 07/17/2023 0.2  0.0 - 0.7 10e3/uL Final    Absolute Basophils 07/17/2023 0.1  0.0 - 0.2 10e3/uL Final    Absolute Immature Granulocytes 07/17/2023 0.0  <=0.4 10e3/uL Final    Absolute NRBCs 07/17/2023 0.0  10e3/uL Final

## 2023-08-20 ENCOUNTER — HEALTH MAINTENANCE LETTER (OUTPATIENT)
Age: 38
End: 2023-08-20

## 2023-09-22 NOTE — PROGRESS NOTES
Beth is a 38 year old  female who presents for annual exam.     Besides routine health maintenance, she has no other health concerns today .    HPI:  The patient's PCP is  Jazmyn Escobedo MD.      Periods are regular, heavy.   Was having pain in RLQ, come and go, cramping too but no bleeding/not during her period. Went to ED, had normal CT.   Used to come during middle of cycle, then would come any time and last several days. Has not been tracking. Has been happening for a year now.   Initially thought was duirng ovulation. Then just evlolved to different times coming and going.   Started fish oil and MTV and wondering if it is better.     Wants to check her liver. Has a higher alcohol intake volume, 2-5 glasses wine per day.           GYNECOLOGIC HISTORY:    Patient's last menstrual period was 2023.    Regular menses? yes  Menses every 30 days.  Length of menses: 5 days    Her current contraception method is: vasectomy.  She  reports that she has never smoked. She has never used smokeless tobacco.    Patient is sexually active.  STD testing offered?  Declined  Last PHQ-9 score on record =       2022    11:45 AM   PHQ-9 SCORE   PHQ-9 Total Score 11     Last GAD7 score on record =       2022    11:45 AM   MC-7 SCORE   Total Score 17     Alcohol Score = 4     HEALTH MAINTENANCE:  Care Gaps  Close care gaps     Overdue          Never   Done ADVANCE CARE PLANNING (Every 5 Years)       1995 HEPATITIS B IMMUNIZATION (3 of 3 - 3-dose series)  Last completed: May 26, 1995     SHAHRZAD 1   1997 DTAP/TDAP/TD IMMUNIZATION (2 - Tdap)  Last completed: Dec 31, 1996     Never   Done HEPATITIS C SCREENING (Once)       OCT 8   2021 COVID-19 Vaccine (3 - Pfizer series)  Last completed: Aug 13, 2021          SEP 1   2023 INFLUENZA VACCINE (1)  Last completed: Sep 27, 2012        Upcoming          2025 PAP FOLLOW-UP (Once)  Last completed:  HPV FOLLOW-UP (Once)  Last  Pt is requesting for an order for blood work to be sent to [facility].     [phone]      Last VV 5/19/2020  Next VV 10/13/2020 "completed: 2022     Health maintenance updated:  yes    HISTORY:  OB History    Para Term  AB Living   0 0 0 0 0 0   SAB IAB Ectopic Multiple Live Births   0 0 0 0 0       Patient Active Problem List   Diagnosis    Cervical high risk HPV (human papillomavirus) test positive     Past Surgical History:   Procedure Laterality Date    COLPOSCOPY CERVIX, BIOPSY CERVIX, ENDOCERVICAL CURETTAGE, COMBINED        Social History     Tobacco Use    Smoking status: Never    Smokeless tobacco: Never   Substance Use Topics    Alcohol use: Yes     Alcohol/week: 0.0 standard drinks of alcohol     Comment: 3 to 3.5 glasses of wine a day       Problem (# of Occurrences) Relation (Name,Age of Onset)    Autoimmune Disease (1) Mother    Heart Failure (1) Father    Hashimoto's thyroiditis (1) Mother    Hodgkin's lymphoma (1) Sister:     Hypothyroidism (1) Mother              No current outpatient medications on file.     No current facility-administered medications for this visit.     No Known Allergies    Past medical, surgical, social and family histories were reviewed and updated in EPIC.    ROS:   12 point review of systems negative other than symptoms noted below or in the HPI.  No urinary frequency or dysuria, bladder or kidney problems    EXAM:  /70   Pulse 73   Ht 1.727 m (5' 8\")   Wt 70.3 kg (155 lb)   LMP 2023   BMI 23.57 kg/m     BMI: Body mass index is 23.57 kg/m .    PHYSICAL EXAM:  Constitutional:   Appearance: Well nourished, well developed, alert, in no acute distress  Neck:  Lymph Nodes:  No lymphadenopathy present    Thyroid:  Gland size normal, nontender, no nodules or masses present  on palpation  Chest:  Respiratory Effort:  Breathing unlabored  Cardiovascular:    Heart: Auscultation:  Regular rate, normal rhythm, no murmurs present  Breasts: Inspection of Breasts:  No lymphadenopathy present., Palpation of Breasts and Axillae:  No masses present on palpation, no breast " tenderness., Axillary Lymph Nodes:  No lymphadenopathy present., and No nodularity, asymmetry or nipple discharge bilaterally.  Gastrointestinal:   Abdominal Examination:  Abdomen nontender to palpation, tone normal without rigidity or guarding, no masses present, umbilicus without lesions   Liver and Spleen:  No hepatomegaly present, liver nontender to palpation    Hernias:  No hernias present  Lymphatic: Lymph Nodes:  No other lymphadenopathy present  Skin:  General Inspection:  No rashes present, no lesions present, no areas of  discoloration  Neurologic:    Mental Status:  Oriented X3.  Normal strength and tone, sensory exam                grossly normal, mentation intact and speech normal.    Psychiatric:   Mentation appears normal and affect normal/bright.         Pelvic Exam:  External Genitalia:     Normal appearance for age, no discharge present, no tenderness present, no inflammatory lesions present, color normal  Vagina:     Normal vaginal vault without central or paravaginal defects, no discharge present, no inflammatory lesions present, no masses present  Bladder:     Nontender to palpation  Urethra:   Urethral Body:  Urethra palpation normal, urethra structural support normal   Urethral Meatus:  No erythema or lesions present  Cervix:     Appearance healthy, no lesions present, nontender to palpation, no bleeding present  Uterus:     Uterus: firm, normal sized and nontender, midplane in position.   Adnexa:     No adnexal tenderness present, no adnexal masses present  Perineum:     Perineum within normal limits, no evidence of trauma, no rashes or skin lesions present  Anus:     Anus within normal limits, no hemorrhoids present  Inguinal Lymph Nodes:     No lymphadenopathy present  Pubic Hair:     Normal pubic hair distribution for age  Genitalia and Groin:     No rashes present, no lesions present, no areas of discoloration, no masses present    COUNSELING:   Reviewed preventive health counseling, as  reflected in patient instructions       Alcohol Use       Osteoporosis prevention/bone health       Consider Hep C screening for all patients one time for ages 18-79 years    BMI: Body mass index is 23.57 kg/m .      ASSESSMENT:  38 year old female with satisfactory annual exam.    ICD-10-CM    1. Encounter for gynecological examination without abnormal finding  Z01.419 Lipid panel reflex to direct LDL Fasting     Hepatic function panel      2. Encounter for hepatitis C screening test for low risk patient  Z11.59 Hepatitis C Screen Reflex to HCV RNA Quant and Genotype      3. Family history of thyroid disease  Z83.49 TSH with free T4 reflex      4. Encounter for lipid screening for cardiovascular disease  Z13.220 Lipid panel reflex to direct LDL Fasting    Z13.6       5. Drinks alcohol  Z78.9 Hepatic function panel          PLAN:  -UTD for cervical cancer screening.    -Breast self awareness discussed. Due for mammogram.  -Colonoscopy age 43  -Osteoporosis prevention discussed.  -Fasting for labs. Will check TFTs due to family hx. Lipids and LFTs due to occ excess alcohol intake. Recommend she decrease this volume.   Due for hep c screen.   Will have follow-up with PCP in 2 d for her CT follow up.  -Return one year for next annual exam  -Occ RLQ pain. Discussed resuming OCPs or nuvaring for 3mo to see if pain resolves-I suspect it would. If does not, then know it is not GYN related. Wants to track cycles and pain before deciding if wants to go on meds.     Gisele De Leon Masters, DO

## 2023-09-25 ENCOUNTER — ANCILLARY PROCEDURE (OUTPATIENT)
Dept: MAMMOGRAPHY | Facility: CLINIC | Age: 38
End: 2023-09-25
Payer: COMMERCIAL

## 2023-09-25 ENCOUNTER — OFFICE VISIT (OUTPATIENT)
Dept: OBGYN | Facility: CLINIC | Age: 38
End: 2023-09-25
Payer: COMMERCIAL

## 2023-09-25 VITALS
HEART RATE: 73 BPM | SYSTOLIC BLOOD PRESSURE: 116 MMHG | HEIGHT: 68 IN | BODY MASS INDEX: 23.49 KG/M2 | WEIGHT: 155 LBS | DIASTOLIC BLOOD PRESSURE: 70 MMHG

## 2023-09-25 DIAGNOSIS — Z01.419 ENCOUNTER FOR GYNECOLOGICAL EXAMINATION WITHOUT ABNORMAL FINDING: Primary | ICD-10-CM

## 2023-09-25 DIAGNOSIS — Z78.9 DRINKS ALCOHOL: ICD-10-CM

## 2023-09-25 DIAGNOSIS — Z13.6 ENCOUNTER FOR LIPID SCREENING FOR CARDIOVASCULAR DISEASE: ICD-10-CM

## 2023-09-25 DIAGNOSIS — Z11.59 ENCOUNTER FOR HEPATITIS C SCREENING TEST FOR LOW RISK PATIENT: ICD-10-CM

## 2023-09-25 DIAGNOSIS — Z13.220 ENCOUNTER FOR LIPID SCREENING FOR CARDIOVASCULAR DISEASE: ICD-10-CM

## 2023-09-25 DIAGNOSIS — Z12.31 VISIT FOR SCREENING MAMMOGRAM: ICD-10-CM

## 2023-09-25 DIAGNOSIS — Z83.49 FAMILY HISTORY OF THYROID DISEASE: ICD-10-CM

## 2023-09-25 LAB
ALBUMIN SERPL BCG-MCNC: 5 G/DL (ref 3.5–5.2)
ALP SERPL-CCNC: 56 U/L (ref 35–104)
ALT SERPL W P-5'-P-CCNC: 27 U/L (ref 0–50)
AST SERPL W P-5'-P-CCNC: 32 U/L (ref 0–45)
BILIRUB DIRECT SERPL-MCNC: 0.25 MG/DL (ref 0–0.3)
BILIRUB SERPL-MCNC: 1 MG/DL
CHOLEST SERPL-MCNC: 207 MG/DL
HDLC SERPL-MCNC: 111 MG/DL
LDLC SERPL CALC-MCNC: 86 MG/DL
NONHDLC SERPL-MCNC: 96 MG/DL
PROT SERPL-MCNC: 7.2 G/DL (ref 6.4–8.3)
TRIGL SERPL-MCNC: 52 MG/DL
TSH SERPL DL<=0.005 MIU/L-ACNC: 1.86 UIU/ML (ref 0.3–4.2)

## 2023-09-25 PROCEDURE — 77067 SCR MAMMO BI INCL CAD: CPT | Mod: TC | Performed by: RADIOLOGY

## 2023-09-25 PROCEDURE — 99396 PREV VISIT EST AGE 40-64: CPT | Performed by: OBSTETRICS & GYNECOLOGY

## 2023-09-25 PROCEDURE — 86803 HEPATITIS C AB TEST: CPT | Performed by: OBSTETRICS & GYNECOLOGY

## 2023-09-25 PROCEDURE — 80076 HEPATIC FUNCTION PANEL: CPT | Performed by: OBSTETRICS & GYNECOLOGY

## 2023-09-25 PROCEDURE — 36415 COLL VENOUS BLD VENIPUNCTURE: CPT | Performed by: OBSTETRICS & GYNECOLOGY

## 2023-09-25 PROCEDURE — 84443 ASSAY THYROID STIM HORMONE: CPT | Performed by: OBSTETRICS & GYNECOLOGY

## 2023-09-25 PROCEDURE — 77063 BREAST TOMOSYNTHESIS BI: CPT | Mod: TC | Performed by: RADIOLOGY

## 2023-09-25 PROCEDURE — 80061 LIPID PANEL: CPT | Performed by: OBSTETRICS & GYNECOLOGY

## 2023-09-25 ASSESSMENT — ANXIETY QUESTIONNAIRES
2. NOT BEING ABLE TO STOP OR CONTROL WORRYING: MORE THAN HALF THE DAYS
5. BEING SO RESTLESS THAT IT IS HARD TO SIT STILL: SEVERAL DAYS
1. FEELING NERVOUS, ANXIOUS, OR ON EDGE: NEARLY EVERY DAY
7. FEELING AFRAID AS IF SOMETHING AWFUL MIGHT HAPPEN: SEVERAL DAYS
6. BECOMING EASILY ANNOYED OR IRRITABLE: MORE THAN HALF THE DAYS
GAD7 TOTAL SCORE: 14
IF YOU CHECKED OFF ANY PROBLEMS ON THIS QUESTIONNAIRE, HOW DIFFICULT HAVE THESE PROBLEMS MADE IT FOR YOU TO DO YOUR WORK, TAKE CARE OF THINGS AT HOME, OR GET ALONG WITH OTHER PEOPLE: VERY DIFFICULT
3. WORRYING TOO MUCH ABOUT DIFFERENT THINGS: NEARLY EVERY DAY
GAD7 TOTAL SCORE: 14

## 2023-09-25 ASSESSMENT — PATIENT HEALTH QUESTIONNAIRE - PHQ9
SUM OF ALL RESPONSES TO PHQ QUESTIONS 1-9: 8
5. POOR APPETITE OR OVEREATING: MORE THAN HALF THE DAYS

## 2023-09-26 LAB — HCV AB SERPL QL IA: NONREACTIVE

## 2023-10-30 ENCOUNTER — TELEPHONE (OUTPATIENT)
Dept: PEDIATRICS | Facility: CLINIC | Age: 38
End: 2023-10-30
Payer: COMMERCIAL

## 2023-10-30 NOTE — TELEPHONE ENCOUNTER
Order/Referral Request    Who is requesting: Patient     Orders being requested: CT of abdominal     Reason service is needed/diagnosis: unknown    When are orders needed by: ASAP    Has this been discussed with Provider: Yes     Does patient have a preference on a Group/Provider/Facility? Within     Does patient have an appointment scheduled?: Yes: patient declined    Where to send orders: Place orders within Epic    Could we send this information to you in [facility] or would you prefer to receive a phone call?:   Patient would prefer a phone call   Okay to leave a detailed message?: Yes at Cell number on file:    Telephone Information:   Mobile [phone]

## 2023-10-31 ENCOUNTER — NURSE TRIAGE (OUTPATIENT)
Dept: NURSING | Facility: CLINIC | Age: 38
End: 2023-10-31
Payer: COMMERCIAL

## 2023-10-31 DIAGNOSIS — M89.9 BONE LESION: Primary | ICD-10-CM

## 2023-10-31 NOTE — TELEPHONE ENCOUNTER
The liver finding was cyst vs hemangioma and no follow-up was recommended by the radiologist. They recommend a CT pelvis as follow-up:    IMPRESSION:   1.  No acute findings in the abdomen or pelvis to explain the  patient's symptoms. No inflammatory process, bowel obstruction or  hydronephrosis.  2.  Nonvisualized appendix. No secondary signs of appendicitis.  3.  Trace pelvic free fluid is likely physiologic.  4.  Indeterminate but likely benign 9 mm sclerotic lesion in the  posterior right iliac bone. Recommend 3-6 month noncontrast CT pelvis  follow-up    I can change to ct abdominal/pelvis but patient may want to check with her insurance to be sure it is covered. Please route back if that is how she wants to proceed given the radiologists recommendation above.

## 2023-10-31 NOTE — TELEPHONE ENCOUNTER
Last OV w/ resident 7/26/23, with Dr. Matson precepting. Per last CT result note 7/17/23: 4.  Indeterminate but likely benign 9 mm sclerotic lesion in the  posterior right iliac bone. Recommend 3-6 month noncontrast CT pelvis  follow-up.    Do you recommend visit to discuss or order?     Saeed BOWENS RN 10/31/2023 at 10:53 AM

## 2023-10-31 NOTE — TELEPHONE ENCOUNTER
Routing to triage for further evaluation.    Saw Dr. Escobedo (resident) 7/26/2023 - Dr. Matson was preceptor.

## 2023-10-31 NOTE — TELEPHONE ENCOUNTER
Called and gave patient scheduling number to call and schedule for CT. Patient will call back once scheduled for CT to schedule follow up appointment to discuss results.     Saeed BOWENS RN 10/31/2023 at 11:23 AM

## 2023-10-31 NOTE — TELEPHONE ENCOUNTER
per chart patient was to have pelvic US done and then follow-up with Dr Escobedo, but this appointment was cancelled. per radiologist recommend 3-6 month non contract CT as follow-up, will place this order now but patient will need to schedule with Dr Escobedo to discuss results and also follow-up on her other symptoms.    well developed, well nourished , in no acute distress , ambulating without difficulty , normal communication ability

## 2023-10-31 NOTE — TELEPHONE ENCOUNTER
Nurse Triage SBAR    Is this a 2nd Level Triage? YES, LICENSED PRACTITIONER REVIEW IS REQUIRED    Situation:   Patient calling to request an order for a CT scan w/o contrast.     Background:   Patient reports going to the ED this past July for abd pain & was told to follow up in 3 to 4 months w/ a CT scan due to a lesion on her liver & iliac bone.  Patient reports CT was recommended by ED provider.     Assessment:   At time of call, patient denies having any pain.  Patient reports last time she had abd pain was last month during her menstrual cycle.      Patient denies having any current ill symptoms.  Protocol Recommended Disposition:   See in Office Today    Recommendation: Patient calling just for CT w/o contrast order as recommended.     Routed to provider  Please review & follow up w/ patient.    Gauri Pena RN on 10/31/2023 at 10:47 AM     Does the patient meet one of the following criteria for ADS visit consideration? 16+ years old, with an [facility] PCP     TIP  Providers, please consider if this condition is appropriate for management at one of our Acute and Diagnostic Services sites.     If patient is a good candidate, please use dotphrase <dot>triageresponse and select Refer to ADS to document.      Reason for Disposition   Patient wants to be seen    Additional Information   Negative: Passed out (i.e., fainted, collapsed and was not responding)   Negative: Shock suspected (e.g., cold/pale/clammy skin, too weak to stand, low BP, rapid pulse)   Negative: Sounds like a life-threatening emergency to the triager   Negative: SEVERE abdominal pain (e.g., excruciating)   Negative: Vomiting red blood or black (coffee ground) material   Negative: Blood in bowel movements  (Exception: Blood on surface of BM with constipation.)   Negative: Black or tarry bowel movements  (Exception: Chronic-unchanged black-grey BMs AND is taking iron pills or Pepto-Bismol.)   Negative: MILD TO MODERATE constant pain lasting > 2  hours   Negative: Vomiting bile (green color)   Negative: Patient sounds very sick or weak to the triager   Negative: Vomiting and abdomen looks much more swollen than usual   Negative: White of the eyes have turned yellow (i.e., jaundice)   Negative: Blood in urine (red, pink, or tea-colored)   Negative: Fever > 103 F (39.4 C)   Negative: Fever > 101 F (38.3 C) and over 60 years of age   Negative: Fever > 100.0 F (37.8 C) and has diabetes mellitus or a weak immune system (e.g., HIV positive, cancer chemotherapy, organ transplant, splenectomy, chronic steroids)   Negative: Fever > 100.0 F (37.8 C) and bedridden (e.g., CVA, chronic illness, recovering from surgery)   Negative: Pregnant or could be pregnant (i.e., missed last menstrual period)   Negative: MODERATE pain (e.g., interferes with normal activities that comes and goes (cramps) lasts > 24 hours  (Exception: Pain with Vomiting or Diarrhea - see that Protocol.)   Negative: Age > 60 years    Protocols used: Abdominal Pain - Female-A-[place]

## 2023-10-31 NOTE — TELEPHONE ENCOUNTER
Called patient back to review providers recommendation with her. Explained to patient what a hemangioma consists of. She verbally understood and would like to proceed with the pelvis CT only at this time. She is calling to get that scheduled and then will call us to get a follow up with PCP scheduled.     PREETHI Joy on 10/31/2023 at 12:15 PM

## 2023-10-31 NOTE — TELEPHONE ENCOUNTER
Patient called back to ask about a CT of the abdomen as well since a lesion was found on her liver in July. Order written is for pelvis only.     Routing to precepting provider for recommendation.     Thank you.     PREETHI Joy on 10/31/2023 at 11:48 AM

## 2023-11-01 ENCOUNTER — TRANSFERRED RECORDS (OUTPATIENT)
Dept: HEALTH INFORMATION MANAGEMENT | Facility: CLINIC | Age: 38
End: 2023-11-01
Payer: COMMERCIAL

## 2023-11-17 ENCOUNTER — HOSPITAL ENCOUNTER (OUTPATIENT)
Dept: CT IMAGING | Facility: CLINIC | Age: 38
Discharge: HOME | End: 2023-11-17
Attending: INTERNAL MEDICINE | Admitting: INTERNAL MEDICINE
Payer: COMMERCIAL

## 2023-11-17 DIAGNOSIS — M89.9 BONE LESION: ICD-10-CM

## 2023-11-17 PROCEDURE — 72192 CT PELVIS W/O DYE: CPT

## 2023-12-20 ENCOUNTER — OFFICE VISIT (OUTPATIENT)
Dept: PEDIATRICS | Facility: CLINIC | Age: 38
End: 2023-12-20
Payer: COMMERCIAL

## 2023-12-20 VITALS
OXYGEN SATURATION: 99 % | RESPIRATION RATE: 16 BRPM | TEMPERATURE: 97.4 F | HEART RATE: 71 BPM | DIASTOLIC BLOOD PRESSURE: 73 MMHG | SYSTOLIC BLOOD PRESSURE: 119 MMHG | WEIGHT: 157.3 LBS | BODY MASS INDEX: 23.84 KG/M2 | HEIGHT: 68 IN

## 2023-12-20 DIAGNOSIS — M89.9 BONE LESION: Primary | ICD-10-CM

## 2023-12-20 PROCEDURE — 99214 OFFICE O/P EST MOD 30 MIN: CPT | Mod: GC

## 2023-12-20 ASSESSMENT — PAIN SCALES - GENERAL: PAINLEVEL: NO PAIN (0)

## 2023-12-20 NOTE — PROGRESS NOTES
Assessment & Plan   Opal is a 38-year-old female who  presents to the clinic for CT scan follow-up    Bone lesion on CT scan, benign  Opal was found to have a indeterminate likely benign 9 mm sclerotic lesion in the posterior right iliac bone in July 2023, with recommendations for repeat CT pelvis in 3 to 6 months.  Patient is presenting today to review the results of repeat CT pelvis.  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  Discussed that found lesions are noted to be benign and will no longer require any further follow-up at this time.  Patient is voices understanding.     Right lower quadrant pain  Patient continues to endorse intermittent right lower quadrant pain, she has noted that this occurs most likely when she is ovulating.  She has similar pains when she is on her period.  She manages the pain with ibuprofen which seems to help at times.  She is following with an OB/GYN and [facility] with a normal pelvic ultrasound with a small fibroid per patient.  Records were not available for review.  Patient reports that she had discussions with her OB/GYN to trial OCPs versus further workup for endometriosis.  Reassuring that CT scan without masses or concerning findings in the pelvis to explain the right lower quadrant pain.  -Please follow-up for annual exam in July 2023.  -Patient will continue to see her OB/GYN for further workup of right lower quadrant pain          Jazmyn Escobedo MD  [facility]      I have seen this patient and I was present during key portions of the procedure/exam and immediately available to furnish services during the entire visit. I have reviewed the documentation and discussed the clinical decision making with Dr. Escobedo. Her progress note reflects our joint assessment and plan.     Zaheer Matson M.D.  Internal  "Medicine-Pediatrics    Subjective   Beth is a 38 year old, presenting for the following health issues:  RECHECK        12/20/2023     2:41 PM   Additional Questions   Roomed by spencer   Accompanied by rosy         12/20/2023     2:41 PM   Patient Reported Additional Medications   Patient reports taking the following new medications na       History of Present Illness       Reason for visit:  Follow-up for lower right abdominal pain and to review follow-up CT scan of pelvic region.    She eats 2-3 servings of fruits and vegetables daily.She consumes 0 sweetened beverage(s) daily.She exercises with enough effort to increase her heart rate 30 to 60 minutes per day.  She exercises with enough effort to increase her heart rate 3 or less days per week.   She is taking medications regularly.        Objective    /73   Pulse 71   Temp 97.4  F (36.3  C) (Temporal)   Resp 16   Ht 1.727 m (5' 8\")   Wt 71.4 kg (157 lb 4.8 oz)   LMP 12/18/2023   SpO2 99%   BMI 23.92 kg/m    Body mass index is 23.92 kg/m .  Physical Exam   GENERAL: healthy, alert and no distress  RESP: No increased work of breathing, no acute distress, comfortable  CV: Appears well-perfused  MS: Moving all extremities spontaneously, able to ambulate well  NEURO: Conversational, no focal deficits noted, mood and affect are appropriate.    Jazmyn Escobedo MD    Internal Medicine and Pediatrics PGY1          "

## 2024-03-14 ENCOUNTER — NURSE TRIAGE (OUTPATIENT)
Dept: NURSING | Facility: CLINIC | Age: 39
End: 2024-03-14
Payer: COMMERCIAL

## 2024-03-14 ENCOUNTER — HOSPITAL ENCOUNTER (EMERGENCY)
Facility: CLINIC | Age: 39
Discharge: HOME | End: 2024-03-14
Attending: EMERGENCY MEDICINE | Admitting: EMERGENCY MEDICINE
Payer: COMMERCIAL

## 2024-03-14 ENCOUNTER — APPOINTMENT (OUTPATIENT)
Dept: GENERAL RADIOLOGY | Facility: CLINIC | Age: 39
End: 2024-03-14
Attending: EMERGENCY MEDICINE
Payer: COMMERCIAL

## 2024-03-14 VITALS
RESPIRATION RATE: 16 BRPM | HEART RATE: 62 BPM | SYSTOLIC BLOOD PRESSURE: 102 MMHG | DIASTOLIC BLOOD PRESSURE: 69 MMHG | TEMPERATURE: 98.4 F | OXYGEN SATURATION: 99 %

## 2024-03-14 DIAGNOSIS — R07.9 CHEST PAIN, UNSPECIFIED TYPE: ICD-10-CM

## 2024-03-14 LAB
ANION GAP SERPL CALCULATED.3IONS-SCNC: 9 MMOL/L (ref 7–15)
ATRIAL RATE - MUSE: 67 BPM
BASOPHILS # BLD AUTO: 0 10E3/UL (ref 0–0.2)
BASOPHILS NFR BLD AUTO: 0 %
BUN SERPL-MCNC: 8.4 MG/DL (ref 6–20)
CALCIUM SERPL-MCNC: 8.9 MG/DL (ref 8.6–10)
CHLORIDE SERPL-SCNC: 103 MMOL/L (ref 98–107)
CREAT SERPL-MCNC: 0.61 MG/DL (ref 0.51–0.95)
D DIMER PPP FEU-MCNC: 0.29 UG/ML FEU (ref 0–0.5)
DEPRECATED HCO3 PLAS-SCNC: 25 MMOL/L (ref 22–29)
DIASTOLIC BLOOD PRESSURE - MUSE: NORMAL MMHG
EGFRCR SERPLBLD CKD-EPI 2021: >90 ML/MIN/1.73M2
EOSINOPHIL # BLD AUTO: 0.1 10E3/UL (ref 0–0.7)
EOSINOPHIL NFR BLD AUTO: 1 %
ERYTHROCYTE [DISTWIDTH] IN BLOOD BY AUTOMATED COUNT: 11.8 % (ref 10–15)
FLUAV RNA SPEC QL NAA+PROBE: NEGATIVE
FLUBV RNA RESP QL NAA+PROBE: NEGATIVE
GLUCOSE SERPL-MCNC: 110 MG/DL (ref 70–99)
HCT VFR BLD AUTO: 39.9 % (ref 35–47)
HGB BLD-MCNC: 13.8 G/DL (ref 11.7–15.7)
HOLD SPECIMEN: NORMAL
IMM GRANULOCYTES # BLD: 0 10E3/UL
IMM GRANULOCYTES NFR BLD: 0 %
INTERPRETATION ECG - MUSE: NORMAL
LYMPHOCYTES # BLD AUTO: 2 10E3/UL (ref 0.8–5.3)
LYMPHOCYTES NFR BLD AUTO: 34 %
MCH RBC QN AUTO: 31.5 PG (ref 26.5–33)
MCHC RBC AUTO-ENTMCNC: 34.6 G/DL (ref 31.5–36.5)
MCV RBC AUTO: 91 FL (ref 78–100)
MONOCYTES # BLD AUTO: 0.5 10E3/UL (ref 0–1.3)
MONOCYTES NFR BLD AUTO: 8 %
NEUTROPHILS # BLD AUTO: 3.3 10E3/UL (ref 1.6–8.3)
NEUTROPHILS NFR BLD AUTO: 57 %
NRBC # BLD AUTO: 0 10E3/UL
NRBC BLD AUTO-RTO: 0 /100
P AXIS - MUSE: 74 DEGREES
PLATELET # BLD AUTO: 160 10E3/UL (ref 150–450)
POTASSIUM SERPL-SCNC: 3.5 MMOL/L (ref 3.4–5.3)
PR INTERVAL - MUSE: 138 MS
QRS DURATION - MUSE: 86 MS
QT - MUSE: 402 MS
QTC - MUSE: 424 MS
R AXIS - MUSE: 68 DEGREES
RBC # BLD AUTO: 4.38 10E6/UL (ref 3.8–5.2)
RSV RNA SPEC NAA+PROBE: NEGATIVE
SARS-COV-2 RNA RESP QL NAA+PROBE: NEGATIVE
SODIUM SERPL-SCNC: 137 MMOL/L (ref 135–145)
SYSTOLIC BLOOD PRESSURE - MUSE: NORMAL MMHG
T AXIS - MUSE: 26 DEGREES
TROPONIN T SERPL HS-MCNC: <6 NG/L
VENTRICULAR RATE- MUSE: 67 BPM
WBC # BLD AUTO: 5.8 10E3/UL (ref 4–11)

## 2024-03-14 PROCEDURE — 84484 ASSAY OF TROPONIN QUANT: CPT | Performed by: EMERGENCY MEDICINE

## 2024-03-14 PROCEDURE — 80048 BASIC METABOLIC PNL TOTAL CA: CPT | Performed by: EMERGENCY MEDICINE

## 2024-03-14 PROCEDURE — 87637 SARSCOV2&INF A&B&RSV AMP PRB: CPT | Performed by: EMERGENCY MEDICINE

## 2024-03-14 PROCEDURE — 36415 COLL VENOUS BLD VENIPUNCTURE: CPT | Performed by: EMERGENCY MEDICINE

## 2024-03-14 PROCEDURE — 71046 X-RAY EXAM CHEST 2 VIEWS: CPT

## 2024-03-14 PROCEDURE — 85025 COMPLETE CBC W/AUTO DIFF WBC: CPT | Performed by: EMERGENCY MEDICINE

## 2024-03-14 PROCEDURE — 93005 ELECTROCARDIOGRAM TRACING: CPT

## 2024-03-14 PROCEDURE — 96360 HYDRATION IV INFUSION INIT: CPT

## 2024-03-14 PROCEDURE — 99285 EMERGENCY DEPT VISIT HI MDM: CPT | Mod: 25

## 2024-03-14 PROCEDURE — 96361 HYDRATE IV INFUSION ADD-ON: CPT

## 2024-03-14 PROCEDURE — 85379 FIBRIN DEGRADATION QUANT: CPT | Performed by: EMERGENCY MEDICINE

## 2024-03-14 PROCEDURE — 258N000003 HC RX IP 258 OP 636: Performed by: EMERGENCY MEDICINE

## 2024-03-14 RX ORDER — KETOROLAC TROMETHAMINE 15 MG/ML
15 INJECTION, SOLUTION INTRAMUSCULAR; INTRAVENOUS ONCE
Status: COMPLETED | OUTPATIENT
Start: 2024-03-14 | End: 2024-03-14

## 2024-03-14 RX ADMIN — SODIUM CHLORIDE 1000 ML: 9 INJECTION, SOLUTION INTRAVENOUS at 10:11

## 2024-03-14 ASSESSMENT — COLUMBIA-SUICIDE SEVERITY RATING SCALE - C-SSRS
2. HAVE YOU ACTUALLY HAD ANY THOUGHTS OF KILLING YOURSELF IN THE PAST MONTH?: NO
6. HAVE YOU EVER DONE ANYTHING, STARTED TO DO ANYTHING, OR PREPARED TO DO ANYTHING TO END YOUR LIFE?: NO
1. IN THE PAST MONTH, HAVE YOU WISHED YOU WERE DEAD OR WISHED YOU COULD GO TO SLEEP AND NOT WAKE UP?: NO

## 2024-03-14 ASSESSMENT — ACTIVITIES OF DAILY LIVING (ADL)
ADLS_ACUITY_SCORE: 33
ADLS_ACUITY_SCORE: 35
ADLS_ACUITY_SCORE: 35

## 2024-03-14 NOTE — TELEPHONE ENCOUNTER
"Nurse Triage SBAR    Is this a 2nd Level Triage? NO    Situation:   Patient calling reporting chest \"discomfort.\"    Background:   Denies cardiac history or risk factors.    Assessment:  Patient reporting \"dull\" chest pain over left side of chest  \"over heart area.\"  Stating this is currently present, waking from sleep intermittently throughout night.  Symptoms started on 3/11/24 and have been intermittent.  Non radiating, denies any specific change to pain with movement, position change.  Episodes on 3/11 and 3/12 of elevated heart rate 's while standing.  Stating she feels pulse rate has been \"normal\" in past 2 days again.  Shortness of breath on exertion.   Increased fatigue.  Stating she is under increased stress.    Protocol Recommended Disposition:   See in ED. Patient agrees to have someone drive her to [facility] ED now.    Reason for Disposition   Difficulty breathing    Additional Information   Negative: SEVERE difficulty breathing (e.g., struggling for each breath, speaks in single words)   Negative: Difficult to awaken or acting confused (e.g., disoriented, slurred speech)   Negative: Shock suspected (e.g., cold/pale/clammy skin, too weak to stand, low BP, rapid pulse)   Negative: Passed out (i.e., lost consciousness, collapsed and was not responding)   Negative: Chest pain lasting longer than 5 minutes and ANY of the following:    history of heart disease  (i.e., heart attack, bypass surgery, angina, angioplasty, CHF; not just a heart murmur)    described as crushing, pressure-like, or heavy    age > 50    age > 30 AND at least one cardiac risk factor (i.e., hypertension, diabetes, obesity, smoker or strong family history of heart disease)    not relieved with nitroglycerin   Negative: Heart beating < 50 beats per minute OR > 140 beats per minute   Negative: Visible sweat on face or sweat dripping down face   Negative: Sounds like a life-threatening emergency to the triager   Negative: Followed a " "chest injury   Negative: SEVERE chest pain   Negative: [1] Chest pain (or \"angina\") comes and goes AND [2] is happening more often (increasing in frequency) or getting worse (increasing in severity)  (Exception: Chest pains that last only a few seconds.)   Negative: Pain also in shoulder(s) or arm(s) or jaw  (Exception: Pain is clearly made worse by movement.)    Protocols used: Chest Pain-A-AH    "

## 2024-03-14 NOTE — ED PROVIDER NOTES
History     Chief Complaint:  No chief complaint on file.    The history is provided by the patient.      Beth Bradley is a 38 year old female with a history of asthma  presenting with chest pain that onset 2 days ago (3/12/24). Her systems are intermittent. Beth reports that sometimes the pain is uncomfortable, dull, and achy. Other times, the pain feels like a burning sensation. Earlier this week, she was feel lethargic and tired. She otherwise endorses lightheadedness and abdominal pain. Of note, Beth currently has her period and notes abdominal pain is a typical menstrual symptom for her. Patient is eating and drink well, but endorses slight decrease in appetite. Denies fever or vomiting. No known ill contacts. No history of heart problems or blood clots.     Independent Historian:   None - Patient Only    Review of External Notes:   Reviewed patient's office visit from 12/20/2023, patient was seen for a bone lesion on her CT scan which was benign.  She was also having right lower quadrant pain at that time.    Medications:    The patient is not currently taking any regular medications.      Past Medical History:    Asthma  Depression  Herpes genitalis  Human papilloma virus infection  Smoking    Past Surgical History:    Colposcopy   Biopsy cervix  Endocervical curettage      Physical Exam   Patient Vitals for the past 24 hrs:   BP Temp Temp src Pulse Resp SpO2   03/14/24 1010 109/60 -- -- 65 -- 100 %   03/14/24 1000 103/62 -- -- 62 -- 100 %   03/14/24 0922 115/77 98.4  F (36.9  C) Temporal 80 12 99 %      Physical Exam  General: Well-nourished, resting comfortably when I enter the room  Eyes: Pupils equal, conjunctivae pink no scleral icterus or conjunctival injection  ENT:  Moist mucus membranes  Respiratory:  Lungs clear to auscultation bilaterally, no crackles/rubs/wheezes.  Good air movement  CV: Normal rate and rhythm, no murmurs  GI:  Abdomen soft and non-distended.  No tenderness, guarding  or rebound  Skin: Warm, dry.  No rashes or petechiae  Musculoskeletal: No peripheral edema or calf tenderness  Neuro: Alert and oriented to person/place/time  Psychiatric: Normal affect    Emergency Department Course   ECG  ECG taken at 0919, ECG read at 0924  Normal sinus rhythm   Normal ECG   Rate 67 bpm. TN interval 138 ms. QRS duration 86 ms. QT/QTc 402/424 ms. P-R-T axes 74 68 26.     Imaging:  Chest XR,  PA & LAT   Preliminary Result   IMPRESSION: No acute cardiopulmonary disease.         Laboratory:  Labs Ordered and Resulted from Time of ED Arrival to Time of ED Departure   BASIC METABOLIC PANEL - Abnormal       Result Value    Sodium 137      Potassium 3.5      Chloride 103      Carbon Dioxide (CO2) 25      Anion Gap 9      Urea Nitrogen 8.4      Creatinine 0.61      GFR Estimate >90      Calcium 8.9      Glucose 110 (*)    TROPONIN T, HIGH SENSITIVITY - Normal    Troponin T, High Sensitivity <6     INFLUENZA A/B, RSV, & SARS-COV2 PCR - Normal    Influenza A PCR Negative      Influenza B PCR Negative      RSV PCR Negative      SARS CoV2 PCR Negative     D DIMER QUANTITATIVE - Normal    D-Dimer Quantitative 0.29     CBC WITH PLATELETS AND DIFFERENTIAL    WBC Count 5.8      RBC Count 4.38      Hemoglobin 13.8      Hematocrit 39.9      MCV 91      MCH 31.5      MCHC 34.6      RDW 11.8      Platelet Count 160      % Neutrophils 57      % Lymphocytes 34      % Monocytes 8      % Eosinophils 1      % Basophils 0      % Immature Granulocytes 0      NRBCs per 100 WBC 0      Absolute Neutrophils 3.3      Absolute Lymphocytes 2.0      Absolute Monocytes 0.5      Absolute Eosinophils 0.1      Absolute Basophils 0.0      Absolute Immature Granulocytes 0.0      Absolute NRBCs 0.0        Emergency Department Course & Assessments:    Interventions:  Medications   sodium chloride 0.9% BOLUS 1,000 mL (1,000 mLs Intravenous $New Bag 3/14/24 1011)   ketorolac (TORADOL) injection 15 mg (15 mg Intravenous Not Given 3/14/24  1012)      Independent Interpretation (X-rays, CTs, rhythm strip):  I independently interpreted the chest XR, no consolidation.    Assessments/Consultations/Discussion of Management or Tests:  ED Course as of 24 1628   Thu Mar 14, 2024   0943 I obtained the history and examined the patient as noted above.      1153 I rechecked and updated the patient.        Social Determinants of Health affecting care:   None    Disposition:  The patient was discharged to home.     Impression & Plan      MIPS (If applicable):  N/A    Medical Decision Makin-year-old female with a history of asthma presents to the emergency department with a complaint of chest pain.  Has been intermittent for the past 2 days.  She also notes that she has overall not been feeling well, feels very tired.  Has not had any fevers, vomiting.  She has had some nausea.  She reports that her chest pain is on the left side of her chest, it is burning in sensation.  On exam patient's lung sounds are clear.  She is not in any acute distress.  She is not tachycardic or tachypneic.  Workup is reassuring.  No signs of ischemic changes, I have low suspicion for acute coronary syndrome.  No pneumonia on chest x-ray.  D-dimer is negative and I have a low suspicion for PE.  Her flu, COVID, RSV is negative.  Patient is feeling a little bit better after the IV fluids.  She states that she feels reassured and will follow-up with her primary care physician.  I did advise that she may have a viral upper respiratory infection, and the treatment is supportive care such as Tylenol, ibuprofen, fluids.  Patient is discharged home.    Diagnosis:    ICD-10-CM    1. Chest pain, unspecified type  R07.9          Discharge Medications:  New Prescriptions    No medications on file      Scribe Disclosure:  Adelita COLON, am serving as a scribe at 9:43 AM on 3/14/2024 to document services personally performed by Adelita Burnett MD based on my observations and  the provider's statements to me.  3/14/2024   Adelita Burnett MD Richardson, Elizabeth, MD  03/14/24 4517

## 2024-03-14 NOTE — ED TRIAGE NOTES
"Mid to left sternal CP without fevers, dizziness, or SOB, but intermittent lightheaded sensation. Pain described as \"burning.\" ABC in tact. A/OX4     Triage Assessment (Adult)       Row Name 03/14/24 0923          Triage Assessment    Airway WDL WDL        Respiratory WDL    Respiratory WDL WDL        Skin Circulation/Temperature WDL    Skin Circulation/Temperature WDL WDL        Cardiac WDL    Cardiac WDL X;chest pain        Peripheral/Neurovascular WDL    Peripheral Neurovascular WDL WDL        Cognitive/Neuro/Behavioral WDL    Cognitive/Neuro/Behavioral WDL WDL                     "

## 2024-03-22 ENCOUNTER — OFFICE VISIT (OUTPATIENT)
Dept: PEDIATRICS | Facility: CLINIC | Age: 39
End: 2024-03-22
Payer: COMMERCIAL

## 2024-03-22 VITALS
OXYGEN SATURATION: 100 % | BODY MASS INDEX: 24.4 KG/M2 | DIASTOLIC BLOOD PRESSURE: 80 MMHG | HEIGHT: 68 IN | SYSTOLIC BLOOD PRESSURE: 116 MMHG | HEART RATE: 63 BPM | WEIGHT: 161 LBS | TEMPERATURE: 97.9 F

## 2024-03-22 DIAGNOSIS — R10.13 ABDOMINAL PAIN, EPIGASTRIC: ICD-10-CM

## 2024-03-22 DIAGNOSIS — Z83.49 FAMILY HISTORY OF THYROID DISEASE: Primary | ICD-10-CM

## 2024-03-22 DIAGNOSIS — H81.10 BENIGN PAROXYSMAL POSITIONAL VERTIGO, UNSPECIFIED LATERALITY: ICD-10-CM

## 2024-03-22 DIAGNOSIS — R10.84 ABDOMINAL PAIN, GENERALIZED: ICD-10-CM

## 2024-03-22 DIAGNOSIS — R07.9 CHEST PAIN, UNSPECIFIED TYPE: ICD-10-CM

## 2024-03-22 PROCEDURE — 87338 HPYLORI STOOL AG IA: CPT | Performed by: PEDIATRICS

## 2024-03-22 PROCEDURE — 36415 COLL VENOUS BLD VENIPUNCTURE: CPT | Performed by: PEDIATRICS

## 2024-03-22 PROCEDURE — 99214 OFFICE O/P EST MOD 30 MIN: CPT | Performed by: PEDIATRICS

## 2024-03-22 PROCEDURE — 84443 ASSAY THYROID STIM HORMONE: CPT | Performed by: PEDIATRICS

## 2024-03-22 PROCEDURE — 86800 THYROGLOBULIN ANTIBODY: CPT | Performed by: PEDIATRICS

## 2024-03-22 ASSESSMENT — PAIN SCALES - GENERAL: PAINLEVEL: NO PAIN (0)

## 2024-03-22 NOTE — PROGRESS NOTES
Assessment & Plan     (Z83.49) Family history of thyroid disease  (primary encounter diagnosis)  Comment: will recheck w/ louisa  Plan: TSH with free T4 reflex, Anti thyroglobulin         antibody            (R10.13) Abdominal pain, epigastric  Comment: chronic symptoms - timeline seems c/w ovulation pain. Discussed OCP - patient will consider. Normal imaging CT and ultrasound.   Plan: Helicobacter pylori Antigen Stool            (H81.10) Benign paroxysmal positional vertigo, unspecified laterality  Comment: two episodes  Plan: needs to cut back on etoh gradualy    (R07.9) Chest pain, unspecified type  Comment: thorough workup in ED  Plan: reassurance    (R10.84) Abdominal pain, generalized  Comment: as above  Plan:           MED REC REQUIRED  Post Medication Reconciliation Status: discharge medications reconciled, continue medications without change    Patient Instructions    Keep health log - abdominal pain, vertigo symptoms, period (start/end date)  Cut back on alcohol  Test thyroid and h pylori    Follow-up with me in 3 months.    Porsche Campos is a 38 year old, presenting for the following health issues:  Establish Care      3/22/2024     9:46 AM   Additional Questions   Roomed by Lani Tejeda CMA   Accompanied by N/A         3/22/2024     9:46 AM   Patient Reported Additional Medications   Patient reports taking the following new medications N/A     HPI     EMERGENCY DEPARTMENT follow up:    3/12/24 - patient seen for chest pain  EKG/cxr, trop, ddimer, bmp, cbc, rsv, covid, flu neg    Patient noticed elevated resting heart rate (After daylight savings) - onging dull ache or burning over chest. Since EMERGENCY DEPARTMENT visit the symptoms have not returned.    Patient now noted vertigo while sleeping - this happened this last week and also a few months ago. Both time after awaking up and shifting/rolling over.      SH: sig etoh use - 2-4 glasses of red wine    Patient also has a lot of anxiety right now  "- looking for a job right now.     Lower abdominal pain x few years - pain and cramping - radiating into legs.  She does get bad periods, but now symptoms are outside her menstural cycle.  She did have pain this morning and it went away. Comes 1-2 weeks after her period - lower abdomen and back.     CT from 7/23:    IMPRESSION:   1.  No acute findings in the abdomen or pelvis to explain the  patient's symptoms. No inflammatory process, bowel obstruction or  hydronephrosis.  2.  Nonvisualized appendix. No secondary signs of appendicitis.  3.  Trace pelvic free fluid is likely physiologic.  4.  Indeterminate but likely benign 9 mm sclerotic lesion in the  posterior right iliac bone. Recommend 3-6 month noncontrast CT pelvis  follow-up.    Followed up:   11/23 - IMPRESSION:   1. 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  2. Multiple benign bone islands.  3. Mild degenerative changes of the SI joints.    Patient goes to [facility] - had ultrasound in August [facility] - was told all ok. It was suggested to try birth control to see if this helps with symptoms. Stopped in 2021 - no symptoms while on this. She thinks BP might have been elevated, maybe worsened anxiety.                          Objective    /80 (BP Location: Right arm, Patient Position: Sitting, Cuff Size: Adult Regular)   Pulse 63   Temp 97.9  F (36.6  C) (Oral)   Ht 1.73 m (5' 8.11\")   Wt 73 kg (161 lb)   LMP 03/13/2024   SpO2 100%   BMI 24.40 kg/m    Body mass index is 24.4 kg/m .  Physical Exam   GENERAL: alert and no distress  HENT: ear canals and TM's normal, nose and mouth without ulcers or lesions  NEURO: Normal strength and tone, mentation intact and speech normal            Signed Electronically by: Maira Matias MD    "

## 2024-03-22 NOTE — PATIENT INSTRUCTIONS
Keep health log - abdominal pain, vertigo symptoms, period (start/end date)  Cut back on alcohol  Test thyroid and h pylori    Follow-up with me in 3 months.

## 2024-03-23 LAB — TSH SERPL DL<=0.005 MIU/L-ACNC: 1.88 UIU/ML (ref 0.3–4.2)

## 2024-03-27 LAB — THYROGLOB AB SERPL IA-ACNC: <20 IU/ML

## 2024-03-29 LAB — H PYLORI AG STL QL IA: NEGATIVE

## 2024-03-29 NOTE — RESULT ENCOUNTER NOTE
Doyle Campos ,    The results from your recent lab work are within normal limits.    -All of your labs are normal.      Thank you for choosing [facility] for your health care needs,      Deepthi Mariano PA-C

## 2024-06-07 ENCOUNTER — TRANSFERRED RECORDS (OUTPATIENT)
Dept: HEALTH INFORMATION MANAGEMENT | Facility: CLINIC | Age: 39
End: 2024-06-07

## 2024-06-17 ENCOUNTER — TELEPHONE (OUTPATIENT)
Dept: SCHEDULING | Facility: CLINIC | Age: 39
End: 2024-06-17
Payer: COMMERCIAL

## 2024-06-17 NOTE — TELEPHONE ENCOUNTER
Called and spoke with Beth and informed patient that PCP is out this week but if wanting to be seen sooner we can schedule with her Extender Deepthi Mariano.    Patient declined and wanted to just keep her appointment with PCP scheduled.    Dylan Tam MA

## 2024-06-17 NOTE — TELEPHONE ENCOUNTER
Reason for Call:  Appointment Request    Patient requesting this type of appt: Chronic Diease Management/Medication/Follow-Up    Requested provider: Maira Matias    Reason patient unable to be scheduled: Not within requested timeframe    When does patient want to be seen/preferred time:  Before end of week    Comments: Pt is scheduled for appointment with PCP on 6/25, would like to be seen sooner if possible.    Could we send this information [place] you [place] or would you prefer to receive a phone call?:   Patient would prefer a phone call   Okay to leave a detailed message?: Yes at Cell number on file:    Telephone Information:   Mobile [phone]       Call taken on 6/17/2024 at 9:28 AM by Lexie Ruth

## 2024-06-25 ENCOUNTER — OFFICE VISIT (OUTPATIENT)
Dept: PEDIATRICS | Facility: CLINIC | Age: 39
End: 2024-06-25
Payer: COMMERCIAL

## 2024-06-25 VITALS
OXYGEN SATURATION: 99 % | RESPIRATION RATE: 16 BRPM | HEIGHT: 68 IN | TEMPERATURE: 97.5 F | SYSTOLIC BLOOD PRESSURE: 118 MMHG | HEART RATE: 65 BPM | DIASTOLIC BLOOD PRESSURE: 85 MMHG | BODY MASS INDEX: 24.89 KG/M2 | WEIGHT: 164.19 LBS

## 2024-06-25 DIAGNOSIS — R10.32 ABDOMINAL PAIN, LEFT LOWER QUADRANT: Primary | ICD-10-CM

## 2024-06-25 DIAGNOSIS — R10.31 ABDOMINAL PAIN, RIGHT LOWER QUADRANT: ICD-10-CM

## 2024-06-25 PROCEDURE — 99213 OFFICE O/P EST LOW 20 MIN: CPT | Performed by: PEDIATRICS

## 2024-06-25 PROCEDURE — G2211 COMPLEX E/M VISIT ADD ON: HCPCS | Performed by: PEDIATRICS

## 2024-06-25 ASSESSMENT — PAIN SCALES - GENERAL: PAINLEVEL: NO PAIN (0)

## 2024-06-25 NOTE — PATIENT INSTRUCTIONS
Consider colonoscopy for ongoing lower abdominal symptoms.  We can also consider repeating the abdominal CT scan.    Please consider follow up with you OB to consider an OCP.

## 2024-06-25 NOTE — PROGRESS NOTES
Assessment & Plan     (R10.32) Abdominal pain, left lower quadrant  (primary encounter diagnosis)  Comment: patient with ongoing symptoms of lower abdominal pain with radiation to low back/thighs, chronic.  Normal abd CT and pelvic ultrasound. She has had several episodes of tarry stools. I recommend a colonoscopy as a next step. She states in the past an OCP has helped resolve symptoms, but she wants to know her hormone levels before she starts this - advised to follow up with OB/GYN. She is also drinking excessively and we discussed the need to cut back on this as this can be clouding the picture of pain/anxiety. She is also worried that blood pressure spikes with pain - we discussed this is a normal response. She might follow up with cardiology as she has seen them in the past for a FH of cardiomyopathy.   Plan: Adult GI  Referral - Procedure Only            (R10.31) Abdominal pain, right lower quadrant  Comment: as above.   Plan: Adult GI  Referral - Procedure Only          The longitudinal plan of care for the diagnosis(es)/condition(s) as documented were addressed during this visit. Due to the added complexity in care, I will continue to support Beth in the subsequent management and with ongoing continuity of care.              See Patient Instructions    Subjective   Beth is a 39 year old, presenting for the following health issues:  Pain (Ongoing throughout body )        6/25/2024    11:07 AM   Additional Questions   Roomed by Lani Tejeda CMA   Accompanied by N/A         6/25/2024    11:07 AM   Patient Reported Additional Medications   Patient reports taking the following new medications N/A     History of Present Illness       Back Pain:  She presents for follow up of back pain. Patient's back pain is a recurring problem.  Location of back pain:  Right lower back and left lower back  Description of back pain: dull ache and gnawing  Back pain spreads: right thigh and left  thigh    Since patient first noticed back pain, pain is: unchanged  Does back pain interfere with her job:  Yes       Hypertension: She presents for follow up of hypertension.  She does check blood pressure  regularly outside of the clinic. Outside blood pressures have been over 140/90. She does not follow a low salt diet.     Vascular Disease:  She presents for follow up of vascular disease.     She never takes nitroglycerin. She is not taking daily aspirin.    Reason for visit:  Recurring intermittent lower abdominal pain, lower back pain, and leg cramps.    She eats 0-1 servings of fruits and vegetables daily.She consumes 0 sweetened beverage(s) daily.She exercises with enough effort to increase her heart rate 10 to 19 minutes per day.  She exercises with enough effort to increase her heart rate 3 or less days per week.   She is taking medications regularly.     Today: Still with (not daily) lower abdominal pain to low back and cramping in thighs and legs. Not period pain. Possible pattern she has noticed. Day #10-Day #15 of cycle she has this pain around ovulation.   She has also had random days of pain as well.     BP goes up when she gets pain (highest 150/100), usually 130/90 with pain. Mindful of eating out.     Sometimes gets red at night, she tries to elevate her legs.    BM - sometimes darker/tarry stools - this has happened a few times.     LMP: regular periods    Patient does not that she has a low perfusion oxygen.     Etoh - 2-5 drinks per day with a few days off. Usually wine.     Patient also thinking about follow up with cardiology for FH of CHF  ____________    CT abd/pelvis from 7/23:     IMPRESSION:   1.  No acute findings in the abdomen or pelvis to explain the  patient's symptoms. No inflammatory process, bowel obstruction or  hydronephrosis.  2.  Nonvisualized appendix. No secondary signs of appendicitis.  3.  Trace pelvic free fluid is likely physiologic.  4.  Indeterminate but likely benign  "9 mm sclerotic lesion in the  posterior right iliac bone. Recommend 3-6 month noncontrast CT pelvis  follow-up.     Followed up:   11/23 - IMPRESSION:   1. 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  2. Multiple benign bone islands.  3. Mild degenerative changes of the SI joints.     Patient goes to [facility] - had ultrasound in August [facility] - was told all ok. It was suggested to try birth control to see if this helps with symptoms. Stopped in 2021 - no symptoms while on this. She thinks BP might have been elevated, maybe worsened anxiety.                        Objective    /85 (BP Location: Right arm, Patient Position: Sitting, Cuff Size: Adult Regular)   Pulse 65   Temp 97.5  F (36.4  C) (Oral)   Resp 16   Ht 1.735 m (5' 8.31\")   Wt 74.5 kg (164 lb 3 oz)   SpO2 99%   BMI 24.74 kg/m    Body mass index is 24.74 kg/m .  Physical Exam   GENERAL: alert and no distress  ABDOMEN: soft, nontender, no hepatosplenomegaly, no masses and bowel sounds normal            Signed Electronically by: Maira Matias MD    "

## 2024-07-15 ENCOUNTER — TELEPHONE (OUTPATIENT)
Dept: CARDIOLOGY | Facility: CLINIC | Age: 39
End: 2024-07-15
Payer: COMMERCIAL

## 2024-07-15 DIAGNOSIS — R07.89 CHEST DISCOMFORT: Primary | ICD-10-CM

## 2024-07-15 NOTE — TELEPHONE ENCOUNTER
[facility] Call Center    Phone Message    May a detailed message be left on voicemail: yes     Reason for Call: Symptoms or Concerns     If patient has red-flag symptoms, warm transfer to triage line    Current symptom or concern: BP spikes w/ abdominal pains, red toes    Symptoms have been present for:  6 month(s)    Has patient previously been seen for this? No      Are there any new or worsening symptoms? Yes:     Action Taken: Other: cardio    Travel Screening: Not Applicable    Thank you!  [facility]       Date of Service:

## 2024-07-15 NOTE — TELEPHONE ENCOUNTER
Spoke with patient and she states that her BP is typically normal but can elevate when she has her pain episodes. Pt described her pain episodes as lower Abdominal pains, back pains and leg cramps that come in episodes and then raises BP up to 150/100. Pains are intermittent, coming more often during menstrual cycle and occasionally a few days between. These episodes last between 30 min- few hours. Sometimes takes pain reliever or heating pad during the episodes. Denies chest pain, palpations, or SOB. Explained to patient that pain can elevate the BP and that these pains do not sound cardiac in nature. Pt agrees and is doing more testing through obgyn but is worried that her BP can be up that high for hours. Pt also wanted to connect with Dr. Bobby on red toes, cold hands as well as family hx of heart issues including CHF. Pt booked OV with Dr. Bobby 10/30/24 but is wondering if Dr. Bobby thinks any testing should be done before hand. Will route to Dr. Bobby to review.

## 2024-07-15 NOTE — TELEPHONE ENCOUNTER
Tried to call patient to review symptoms. No answer. Left VM informing patient that she has not been seen since 2021 and recommend discussing with PCP or if she feels she wants cardiology to evaluate her to make a visit. Scheduling number given.

## 2024-07-23 NOTE — TELEPHONE ENCOUNTER
Richard Bobby MD  You44 minutes ago (2:45 PM)       Sure, we can change it to a stress echo.       Spoke with patient and she is agreeable to doing a stress echo. Stress echo ordered. Pt has scheduling number to call to get set up.

## 2024-07-23 NOTE — TELEPHONE ENCOUNTER
Spoke with patient regarding Dr. Coronado recommendation for a repeat echocardiogram. Pt wanted to mention that she notices she has mild chest discomfort at times and will find herself pushing her hand over her left chest. She said its a mild discomfort, not pain. Does not worsen with exertion. No SOB. Pt did try to go jogging and said her HR spiked up way faster than it normally does and so she took it easy and her HR took a while for it to come down. She believes it was regular rhtyhm at that time but has had irregular rhythm alert on her BP monitor a few times which she does not know if that was accurate. Pt is wondering if Dr. Bobby still feels a repeat echocardiogram would be best of if a stress test would be helpful as she has never had one.     Will route to Dr. Bobby to review.

## 2024-07-23 NOTE — TELEPHONE ENCOUNTER
Richard Bobby MD  You49 minutes ago (10:07 AM)       Let's repeat echo please and thanks     Tried to call patient. No answer. Left VM to call team 4 back at [phone]

## 2024-08-07 ENCOUNTER — HOSPITAL ENCOUNTER (EMERGENCY)
Facility: CLINIC | Age: 39
Discharge: HOME | End: 2024-08-07
Attending: STUDENT IN AN ORGANIZED HEALTH CARE EDUCATION/TRAINING PROGRAM | Admitting: STUDENT IN AN ORGANIZED HEALTH CARE EDUCATION/TRAINING PROGRAM
Payer: COMMERCIAL

## 2024-08-07 ENCOUNTER — APPOINTMENT (OUTPATIENT)
Dept: GENERAL RADIOLOGY | Facility: CLINIC | Age: 39
End: 2024-08-07
Attending: EMERGENCY MEDICINE
Payer: COMMERCIAL

## 2024-08-07 ENCOUNTER — NURSE TRIAGE (OUTPATIENT)
Dept: NURSING | Facility: CLINIC | Age: 39
End: 2024-08-07
Payer: COMMERCIAL

## 2024-08-07 VITALS
BODY MASS INDEX: 24.46 KG/M2 | HEART RATE: 81 BPM | DIASTOLIC BLOOD PRESSURE: 96 MMHG | TEMPERATURE: 97.9 F | OXYGEN SATURATION: 99 % | HEIGHT: 69 IN | SYSTOLIC BLOOD PRESSURE: 141 MMHG | RESPIRATION RATE: 18 BRPM | WEIGHT: 165.12 LBS

## 2024-08-07 DIAGNOSIS — R07.9 CHEST PAIN, UNSPECIFIED TYPE: ICD-10-CM

## 2024-08-07 LAB
ALBUMIN SERPL BCG-MCNC: 4.6 G/DL (ref 3.5–5.2)
ALBUMIN UR-MCNC: NEGATIVE MG/DL
ALP SERPL-CCNC: 54 U/L (ref 40–150)
ALT SERPL W P-5'-P-CCNC: 19 U/L (ref 0–50)
ANION GAP SERPL CALCULATED.3IONS-SCNC: 10 MMOL/L (ref 7–15)
APPEARANCE UR: CLEAR
AST SERPL W P-5'-P-CCNC: 21 U/L (ref 0–45)
ATRIAL RATE - MUSE: 60 BPM
BASOPHILS # BLD AUTO: 0 10E3/UL (ref 0–0.2)
BASOPHILS NFR BLD AUTO: 1 %
BILIRUB SERPL-MCNC: 0.6 MG/DL
BILIRUB UR QL STRIP: NEGATIVE
BUN SERPL-MCNC: 12.3 MG/DL (ref 6–20)
CALCIUM SERPL-MCNC: 9.2 MG/DL (ref 8.8–10.4)
CHLORIDE SERPL-SCNC: 103 MMOL/L (ref 98–107)
COLOR UR AUTO: ABNORMAL
CREAT SERPL-MCNC: 0.61 MG/DL (ref 0.51–0.95)
D DIMER PPP FEU-MCNC: <0.27 UG/ML FEU (ref 0–0.5)
DIASTOLIC BLOOD PRESSURE - MUSE: NORMAL MMHG
EGFRCR SERPLBLD CKD-EPI 2021: >90 ML/MIN/1.73M2
EOSINOPHIL # BLD AUTO: 0.1 10E3/UL (ref 0–0.7)
EOSINOPHIL NFR BLD AUTO: 3 %
ERYTHROCYTE [DISTWIDTH] IN BLOOD BY AUTOMATED COUNT: 11.9 % (ref 10–15)
GLUCOSE SERPL-MCNC: 95 MG/DL (ref 70–99)
GLUCOSE UR STRIP-MCNC: NEGATIVE MG/DL
HCO3 SERPL-SCNC: 24 MMOL/L (ref 22–29)
HCT VFR BLD AUTO: 41.9 % (ref 35–47)
HGB BLD-MCNC: 14.3 G/DL (ref 11.7–15.7)
HGB UR QL STRIP: NEGATIVE
HOLD SPECIMEN: NORMAL
IMM GRANULOCYTES # BLD: 0 10E3/UL
IMM GRANULOCYTES NFR BLD: 0 %
INTERPRETATION ECG - MUSE: NORMAL
KETONES UR STRIP-MCNC: NEGATIVE MG/DL
LEUKOCYTE ESTERASE UR QL STRIP: NEGATIVE
LIPASE SERPL-CCNC: 55 U/L (ref 13–60)
LYMPHOCYTES # BLD AUTO: 2.3 10E3/UL (ref 0.8–5.3)
LYMPHOCYTES NFR BLD AUTO: 52 %
MCH RBC QN AUTO: 31.6 PG (ref 26.5–33)
MCHC RBC AUTO-ENTMCNC: 34.1 G/DL (ref 31.5–36.5)
MCV RBC AUTO: 93 FL (ref 78–100)
MONOCYTES # BLD AUTO: 0.4 10E3/UL (ref 0–1.3)
MONOCYTES NFR BLD AUTO: 9 %
MUCOUS THREADS #/AREA URNS LPF: PRESENT /LPF
NEUTROPHILS # BLD AUTO: 1.6 10E3/UL (ref 1.6–8.3)
NEUTROPHILS NFR BLD AUTO: 35 %
NITRATE UR QL: NEGATIVE
NRBC # BLD AUTO: 0 10E3/UL
NRBC BLD AUTO-RTO: 0 /100
P AXIS - MUSE: -8 DEGREES
PH UR STRIP: 6 [PH] (ref 5–7)
PLATELET # BLD AUTO: 223 10E3/UL (ref 150–450)
POTASSIUM SERPL-SCNC: 3.8 MMOL/L (ref 3.4–5.3)
PR INTERVAL - MUSE: 122 MS
PROT SERPL-MCNC: 6.8 G/DL (ref 6.4–8.3)
QRS DURATION - MUSE: 90 MS
QT - MUSE: 430 MS
QTC - MUSE: 430 MS
R AXIS - MUSE: 62 DEGREES
RBC # BLD AUTO: 4.52 10E6/UL (ref 3.8–5.2)
RBC URINE: <1 /HPF
SODIUM SERPL-SCNC: 137 MMOL/L (ref 135–145)
SP GR UR STRIP: 1.02 (ref 1–1.03)
SQUAMOUS EPITHELIAL: 2 /HPF
SYSTOLIC BLOOD PRESSURE - MUSE: NORMAL MMHG
T AXIS - MUSE: 23 DEGREES
TROPONIN T SERPL HS-MCNC: <6 NG/L
UROBILINOGEN UR STRIP-MCNC: NORMAL MG/DL
VENTRICULAR RATE- MUSE: 60 BPM
WBC # BLD AUTO: 4.5 10E3/UL (ref 4–11)
WBC URINE: <1 /HPF

## 2024-08-07 PROCEDURE — 36415 COLL VENOUS BLD VENIPUNCTURE: CPT | Performed by: EMERGENCY MEDICINE

## 2024-08-07 PROCEDURE — 83690 ASSAY OF LIPASE: CPT | Performed by: EMERGENCY MEDICINE

## 2024-08-07 PROCEDURE — 84484 ASSAY OF TROPONIN QUANT: CPT | Performed by: EMERGENCY MEDICINE

## 2024-08-07 PROCEDURE — 99285 EMERGENCY DEPT VISIT HI MDM: CPT | Mod: 25 | Performed by: STUDENT IN AN ORGANIZED HEALTH CARE EDUCATION/TRAINING PROGRAM

## 2024-08-07 PROCEDURE — 93005 ELECTROCARDIOGRAM TRACING: CPT | Performed by: STUDENT IN AN ORGANIZED HEALTH CARE EDUCATION/TRAINING PROGRAM

## 2024-08-07 PROCEDURE — 80053 COMPREHEN METABOLIC PANEL: CPT | Performed by: EMERGENCY MEDICINE

## 2024-08-07 PROCEDURE — 71046 X-RAY EXAM CHEST 2 VIEWS: CPT

## 2024-08-07 PROCEDURE — 81003 URINALYSIS AUTO W/O SCOPE: CPT | Performed by: EMERGENCY MEDICINE

## 2024-08-07 PROCEDURE — 85379 FIBRIN DEGRADATION QUANT: CPT | Performed by: EMERGENCY MEDICINE

## 2024-08-07 PROCEDURE — 85025 COMPLETE CBC W/AUTO DIFF WBC: CPT | Performed by: EMERGENCY MEDICINE

## 2024-08-07 RX ORDER — ONDANSETRON 4 MG/1
4 TABLET, ORALLY DISINTEGRATING ORAL ONCE
Status: DISCONTINUED | OUTPATIENT
Start: 2024-08-07 | End: 2024-08-07 | Stop reason: HOSPADM

## 2024-08-07 ASSESSMENT — ACTIVITIES OF DAILY LIVING (ADL)
ADLS_ACUITY_SCORE: 35
ADLS_ACUITY_SCORE: 35

## 2024-08-07 ASSESSMENT — COLUMBIA-SUICIDE SEVERITY RATING SCALE - C-SSRS
1. IN THE PAST MONTH, HAVE YOU WISHED YOU WERE DEAD OR WISHED YOU COULD GO TO SLEEP AND NOT WAKE UP?: NO
6. HAVE YOU EVER DONE ANYTHING, STARTED TO DO ANYTHING, OR PREPARED TO DO ANYTHING TO END YOUR LIFE?: NO
2. HAVE YOU ACTUALLY HAD ANY THOUGHTS OF KILLING YOURSELF IN THE PAST MONTH?: NO

## 2024-08-07 NOTE — DISCHARGE INSTRUCTIONS
Ibuprofen 600mg 3-4 times a day for pain as needed  Omeprazole daily to help with stomach pain  Follow up with your ob/gyn  Return for worsening symptoms

## 2024-08-07 NOTE — ED PROVIDER NOTES
"  Emergency Department Note      History of Present Illness     Chief Complaint   Chest Pain      HPI   Beth Bradley is a 39 year old female with a history of asthma who presents to the ED today for evaluation of chest pain and abdominal pain. The patient reports she has had waxing and waning chest heaviness, heartburn, and discomfort that has come on at rest for the past few days. She also has lower right quadrant pain that has been intermittent for the past two years. She says this pain was a 7.5/10 right before presentation. She reports that she was recently diagnosed with adenomyosis and was prescribed birth control (Junel) as treatment, but she recently stopped taking it due to its side effects. She also reports that she measures her blood pressure twice a day and has blood pressure spikes to 140-150 that come with her chest and abdominal pain. With no pain present, her blood pressure is normally around 120. She reports that she had sharp chest pains and felt nauseous three days ago, but hasn't felt this since. She does not take any medications. She denies any fever, vomiting, or diarrhea. She also denies any abnormal vaginal bleeding or hematuria. She hasn't had any abdominal surgeries. She doesn't have any known allergies. She mentions that her father has congestive heart failure. She also mentions that she has a stress test scheduled in two days at the heart clinic and had a normal Echo done three years ago.    Independent Historian   None    Review of External Notes   none    Past Medical History   Medical History and Problem List   Asthma  Cervical high risk HPV positive  Depression  Herpes genitalis  Smoking    Medications   Junel    Surgical History   Colposcopy and biopsy of cervix and endocervical curettage    Physical Exam     Patient Vitals for the past 24 hrs:   BP Temp Temp src Pulse Resp SpO2 Height Weight   08/07/24 0651 (!) 141/96 97.9  F (36.6  C) Temporal 81 18 99 % 1.746 m (5' 8.75\") " 74.9 kg (165 lb 2 oz)     Physical Exam  Constitutional:       Appearance: She is well-developed.   HENT:      Right Ear: External ear normal.      Left Ear: External ear normal.      Mouth/Throat:      Mouth: Mucous membranes are moist.      Pharynx: Oropharynx is clear. No oropharyngeal exudate.   Eyes:      General: No scleral icterus.     Conjunctiva/sclera: Conjunctivae normal.      Pupils: Pupils are equal, round, and reactive to light.   Cardiovascular:      Rate and Rhythm: Normal rate and regular rhythm.      Heart sounds: Normal heart sounds. No murmur heard.     No friction rub. No gallop.   Pulmonary:      Effort: Pulmonary effort is normal. No respiratory distress.      Breath sounds: Normal breath sounds. No stridor. No wheezing, rhonchi or rales.   Abdominal:      General: Bowel sounds are normal. There is no distension.      Palpations: Abdomen is soft. There is no mass.      Tenderness: There is abdominal tenderness. There is no right CVA tenderness or left CVA tenderness.      Comments: Mild BLQ TTP   Musculoskeletal:         General: Normal range of motion.   Skin:     General: Skin is warm and dry.      Capillary Refill: Capillary refill takes less than 2 seconds.      Findings: No rash.   Neurological:      Mental Status: She is alert.           Diagnostics     Lab Results   Labs Ordered and Resulted from Time of ED Arrival to Time of ED Departure   ROUTINE UA WITH MICROSCOPIC REFLEX TO CULTURE - Abnormal       Result Value    Color Urine Light Yellow      Appearance Urine Clear      Glucose Urine Negative      Bilirubin Urine Negative      Ketones Urine Negative      Specific Gravity Urine 1.020      Blood Urine Negative      pH Urine 6.0      Protein Albumin Urine Negative      Urobilinogen Urine Normal      Nitrite Urine Negative      Leukocyte Esterase Urine Negative      Mucus Urine Present (*)     RBC Urine <1      WBC Urine <1      Squamous Epithelials Urine 2 (*)    COMPREHENSIVE  METABOLIC PANEL - Normal    Sodium 137      Potassium 3.8      Carbon Dioxide (CO2) 24      Anion Gap 10      Urea Nitrogen 12.3      Creatinine 0.61      GFR Estimate >90      Calcium 9.2      Chloride 103      Glucose 95      Alkaline Phosphatase 54      AST 21      ALT 19      Protein Total 6.8      Albumin 4.6      Bilirubin Total 0.6     TROPONIN T, HIGH SENSITIVITY - Normal    Troponin T, High Sensitivity <6     LIPASE - Normal    Lipase 55     D DIMER QUANTITATIVE - Normal    D-Dimer Quantitative <0.27     CBC WITH PLATELETS AND DIFFERENTIAL    WBC Count 4.5      RBC Count 4.52      Hemoglobin 14.3      Hematocrit 41.9      MCV 93      MCH 31.6      MCHC 34.1      RDW 11.9      Platelet Count 223      % Neutrophils 35      % Lymphocytes 52      % Monocytes 9      % Eosinophils 3      % Basophils 1      % Immature Granulocytes 0      NRBCs per 100 WBC 0      Absolute Neutrophils 1.6      Absolute Lymphocytes 2.3      Absolute Monocytes 0.4      Absolute Eosinophils 0.1      Absolute Basophils 0.0      Absolute Immature Granulocytes 0.0      Absolute NRBCs 0.0         Imaging   XR Chest 2 Views   Final Result   IMPRESSION: Normal.      АНДРЕЙ PEARSON MD            SYSTEM ID:  [id number]          EKG   ECG taken at 0721  Normal sinus rhythm   Rate 60 bpm. OR interval 122 ms. QRS duration 90 ms. QT/QTc 430/430 ms. P-R-T axes -8 62 23.    Independent Interpretation   None    ED Course      Medications Administered   Medications   ondansetron (ZOFRAN ODT) ODT tab 4 mg (has no administration in time range)       Procedures   Procedures     Discussion of Management   None    ED Course   ED Course as of 08/07/24 0903   Wed Aug 07, 2024   0707 I obtained history and examined the patient as noted above.    0830 I rechecked and updated the patient.        Additional Documentation  None    Medical Decision Making / Diagnosis     CMS Diagnoses: None    MIPS       None    MDM   Beth GARZA Kirk is a 39 year old female who  presents with above symptoms.  Patient has had gynecology evaluation for her lower abdominal and pelvic pain.  I do not believe we would need to work this up further today.  Labs are reassuring including normal D-dimer and troponin.  Her chest pain appears to be noncardiac and atypical.  She does have a stress test in 2 days.  Uncertain what the cause of pain.  No signs of PE or ACS.  I did encourage her to continue to follow-up with her doctor to further evaluate her symptoms.  Return precaution provided.    Disposition   The patient was discharged.     Diagnosis     ICD-10-CM    1. Chest pain, unspecified type  R07.9            Discharge Medications   There are no discharge medications for this patient.      Scribe Disclosure:  I, Sonia Arenas, am serving as a scribe at 8:01 AM on 8/7/2024 to document services personally performed by Kody Coley MD based on my observations and the provider's statements to me.        Kody Coley MD  08/07/24 8772

## 2024-08-07 NOTE — ED TRIAGE NOTES
Pt arrives ambulatory with aunt for chest discomfort for two days and lower abdominal pain. Reports chest is not painful but feels heavy. Recent diagnosis of adenomyosis. Has stress test Friday, called nurse line and was told to come to ED. Chest and abdomen discomfort woke pt up twice last night. Heat pack has helped intermittently.

## 2024-08-07 NOTE — TELEPHONE ENCOUNTER
"Pt calls to report \"pains in chest, abdomen, L arm.\"  Abdominal pain has become chronic.  \"Just diagnosed with adenomyosis at [facility].\"  OB provider external to [facility].  Future follow-ups are planned.    Chest pain appears acute at this time.  Chest pain is \"more like heaviness.\"  Current chest pain onset \"before bedtime and has persisted on and off overnight.\"  Also reports \"heartburn-like feeling.\"  Current /92.  Pt refers to current BP as a \"spike up.\"  \"L arm pain comes and goes.\"    Pt had ED eval 3/14/24 for unspecified chest pain.  After Visit Summary as follows:  Return to the Emergency Department if:  Your chest pain changes, gets worse, starts to happen more often, or  comes with less activity.  You are newly short of breath.  You get very weak or tired.  You pass out or faint.  You have any new symptoms, like fever, cough, numb legs, or you  cough up blood.  You have anything else that worries you.    Advised returning to ED immediately.  Reasoning as follows:  - chest pain is occurring at rest (without exertion)  - cardiac issues tend to occur in early am hours  - includes accompanying symptoms (sensation of indigestion, L arm heaviness, increased BP)    Pt reports having someone available to drive her now.  Discussed calling 911 enroute if needed.  [facility] is most feasible.    Chasidy Asencio RN  [facility] Nurse Advisor     Reason for Disposition   Chest pain lasting longer than 5 minutes and ANY of the following:    history of heart disease  (i.e., heart attack, bypass surgery, angina, angioplasty, CHF; not just a heart murmur)    described as crushing, pressure-like, or heavy    age > 50    age > 30 AND at least one cardiac risk factor (i.e., hypertension, diabetes, obesity, smoker or strong family history of heart disease)    not relieved with nitroglycerin     Sensation of \"heaviness\"    Additional Information   Negative: SEVERE difficulty breathing (e.g., struggling " for each breath, speaks in single words)   Negative: Difficult to awaken or acting confused (e.g., disoriented, slurred speech)   Negative: Shock suspected (e.g., cold/pale/clammy skin, too weak to stand, low BP, rapid pulse)   Negative: Passed out (i.e., lost consciousness, collapsed and was not responding)    Protocols used: Chest Pain-A-AH

## 2024-08-09 ENCOUNTER — HOSPITAL ENCOUNTER (OUTPATIENT)
Dept: CARDIOLOGY | Facility: CLINIC | Age: 39
Discharge: HOME | End: 2024-08-09
Attending: INTERNAL MEDICINE | Admitting: INTERNAL MEDICINE
Payer: COMMERCIAL

## 2024-08-09 DIAGNOSIS — R07.89 CHEST DISCOMFORT: ICD-10-CM

## 2024-08-09 PROCEDURE — 93321 DOPPLER ECHO F-UP/LMTD STD: CPT | Mod: 26 | Performed by: INTERNAL MEDICINE

## 2024-08-09 PROCEDURE — 93325 DOPPLER ECHO COLOR FLOW MAPG: CPT | Mod: 26 | Performed by: INTERNAL MEDICINE

## 2024-08-09 PROCEDURE — 255N000002 HC RX 255 OP 636: Performed by: INTERNAL MEDICINE

## 2024-08-09 PROCEDURE — 93018 CV STRESS TEST I&R ONLY: CPT | Performed by: INTERNAL MEDICINE

## 2024-08-09 PROCEDURE — 93350 STRESS TTE ONLY: CPT | Mod: 26 | Performed by: INTERNAL MEDICINE

## 2024-08-09 PROCEDURE — 999N000208 ECHO STRESS ECHOCARDIOGRAM

## 2024-08-09 PROCEDURE — 93016 CV STRESS TEST SUPVJ ONLY: CPT | Performed by: INTERNAL MEDICINE

## 2024-08-09 PROCEDURE — C8928 TTE W OR W/O FOL W/CON,STRES: HCPCS

## 2024-08-09 RX ADMIN — HUMAN ALBUMIN MICROSPHERES AND PERFLUTREN 3 ML: 10; .22 INJECTION, SOLUTION INTRAVENOUS at 13:23

## 2024-08-26 ENCOUNTER — PATIENT OUTREACH (OUTPATIENT)
Dept: CARE COORDINATION | Facility: CLINIC | Age: 39
End: 2024-08-26
Payer: COMMERCIAL

## 2024-09-09 ENCOUNTER — PATIENT OUTREACH (OUTPATIENT)
Dept: CARE COORDINATION | Facility: CLINIC | Age: 39
End: 2024-09-09
Payer: COMMERCIAL

## 2024-10-02 ENCOUNTER — NURSE TRIAGE (OUTPATIENT)
Dept: PEDIATRICS | Facility: CLINIC | Age: 39
End: 2024-10-02

## 2024-10-02 ENCOUNTER — OFFICE VISIT (OUTPATIENT)
Dept: PEDIATRICS | Facility: CLINIC | Age: 39
End: 2024-10-02
Payer: COMMERCIAL

## 2024-10-02 VITALS
DIASTOLIC BLOOD PRESSURE: 89 MMHG | RESPIRATION RATE: 20 BRPM | TEMPERATURE: 97.4 F | OXYGEN SATURATION: 98 % | SYSTOLIC BLOOD PRESSURE: 131 MMHG | HEIGHT: 69 IN | WEIGHT: 162.38 LBS | BODY MASS INDEX: 24.05 KG/M2 | HEART RATE: 80 BPM

## 2024-10-02 DIAGNOSIS — R09.81 SINUS CONGESTION: Primary | ICD-10-CM

## 2024-10-02 DIAGNOSIS — J06.9 UPPER RESPIRATORY TRACT INFECTION, UNSPECIFIED TYPE: ICD-10-CM

## 2024-10-02 PROCEDURE — 99213 OFFICE O/P EST LOW 20 MIN: CPT | Performed by: PHYSICIAN ASSISTANT

## 2024-10-02 RX ORDER — NORETHINDRONE ACETATE AND ETHINYL ESTRADIOL 1; 20 MG/1; UG/1
TABLET ORAL
COMMUNITY
Start: 2024-09-30 | End: 2024-10-30

## 2024-10-02 ASSESSMENT — PAIN SCALES - GENERAL: PAINLEVEL: MODERATE PAIN (5)

## 2024-10-02 NOTE — PROGRESS NOTES
"  Assessment & Plan     Sinus congestion    Upper respiratory tract infection, unspecified type      Patient was seen today for concerns of sinus congestion that started yesterday, after she developed a scratchy throat three days ago.  She is well appearing on exam today.  She has a small clear effusion on the right ear, but otherwise has a normal exam today.  Patient may want to test for COVID (with the upcoming wedding), but will take a home test today to get results faster than she can get in clinic.  Patient was given home cares for treatment recommendations and has been advised to followup as needed.  Patient understands and agrees with the plan today.          Porsche Campos is a 39 year old, presenting for the following health issues:  Otalgia (both)        10/2/2024     9:19 AM   Additional Questions   Roomed by Lani Tejeda CMA   Accompanied by N/A         10/2/2024     9:19 AM   Patient Reported Additional Medications   Patient reports taking the following new medications N/A     HPI     Patient is here with sinus congestion that started yesterday.  She says that she feels the congestion in her forehead, face and her ears.  She is not having a cough or any fevers.  She states that she first developed a scratchy throat on Sunday, and then the sinus symptoms started a couple of days later.    She does have a wedding this weekend that she will be attending.        Review of Systems  Constitutional, neuro, ENT, endocrine, pulmonary, cardiac, gastrointestinal, genitourinary, musculoskeletal, integument and psychiatric systems are negative, except as otherwise noted.      Objective    /89 (BP Location: Right arm, Patient Position: Sitting, Cuff Size: Adult Regular)   Pulse 80   Temp 97.4  F (36.3  C) (Oral)   Resp 20   Ht 1.746 m (5' 8.75\")   Wt 73.7 kg (162 lb 6 oz)   LMP 07/22/2024 (Approximate)   SpO2 98%   BMI 24.15 kg/m    Body mass index is 24.15 kg/m .  Physical Exam   GENERAL: alert " and no distress  EYES: Eyes grossly normal to inspection, PERRL and conjunctivae and sclerae normal  HENT: normal cephalic/atraumatic, right ear: clear effusion, left ear: normal: no effusions, no erythema, normal landmarks, nose and mouth without ulcers or lesions, oropharynx clear, and oral mucous membranes moist  NECK: no adenopathy, no asymmetry, masses, or scars  RESP: lungs clear to auscultation - no rales, rhonchi or wheezes  CV: regular rate and rhythm, normal S1 S2, no S3 or S4, no murmur, click or rub, no peripheral edema  MS: no gross musculoskeletal defects noted, no edema          Signed Electronically by: Deepthi Mariano PA-C

## 2024-10-02 NOTE — TELEPHONE ENCOUNTER
"S-(situation): Received call from patient, requesting appointment today to rule out covid or sinus infection.     B-(background): Patient tested for covid at home on Sunday, negative, but notes it was an \"old\" test.     A-(assessment): Patient experiencing mild sore throat, nasal congestion, sinus pressure, sneezing, and ear pain. Pain was in right ear yesterday, now pain is in left ear. Patient had headache recently, now resolved. Sinus pressure is located on forehead and a little on facial cheeks. No fever. Symptoms started on Sunday.     R-(recommendations): Per protocol, advised visit today. Scheduled for Office visit 10/2/24 w/ extender.     Additional Information   Negative: Sounds like a life-threatening emergency to the triager   Negative: Difficulty breathing, and not from stuffy nose (e.g., not relieved by cleaning out the nose)   Negative: SEVERE headache and has fever   Negative: Patient sounds very sick or weak to the triager   Negative: SEVERE sinus pain   Negative: Severe headache   Negative: Redness or swelling on the cheek, forehead, or around the eye   Negative: Fever > 103 F (39.4 C)   Negative: Fever > 101 F (38.3 C) and over 60 years of age   Negative: Fever > 100.0 F (37.8 C) and has diabetes mellitus or a weak immune system (e.g., HIV positive, cancer chemotherapy, organ transplant, splenectomy, chronic steroids)   Negative: Fever > 100.0 F (37.8 C) and bedridden (e.g., nursing home patient, stroke, chronic illness, recovering from surgery)   Negative: Sinus pain (not just congestion) and fever   Earache   Negative: Fever present > 3 days (72 hours)   Negative: Fever returns after gone for over 24 hours and symptoms worse or not improved    Answer Assessment - Initial Assessment Questions  1. LOCATION: \"Where does it hurt?\"       Forehead and cheeks  2. ONSET: \"When did the sinus pain start?\"  (e.g., hours, days)       Sunday   3. SEVERITY: \"How bad is the pain?\"   (Scale 1-10; mild, moderate " "or severe)    - MILD (1-3): doesn't interfere with normal activities     - MODERATE (4-7): interferes with normal activities (e.g., work or school) or awakens from sleep    - SEVERE (8-10): excruciating pain and patient unable to do any normal activities         mild  4. RECURRENT SYMPTOM: \"Have you ever had sinus problems before?\" If so, ask: \"When was the last time?\" and \"What happened that time?\"       Has happened before.   5. NASAL CONGESTION: \"Is the nose blocked?\" If so, ask, \"Can you open it or must you breathe through the mouth?\"      Has runny drippy nose, clear  6. NASAL DISCHARGE: \"Do you have discharge from your nose?\" If so ask, \"What color?\"      clear  7. FEVER: \"Do you have a fever?\" If so, ask: \"What is it, how was it measured, and when did it start?\"       No   8. OTHER SYMPTOMS: \"Do you have any other symptoms?\" (e.g., sore throat, cough, earache, difficulty breathing)      Right ear pain yesterday, then left ear. Sneezing. Sore throat.   9. PREGNANCY: \"Is there any chance you are pregnant?\" \"When was your last menstrual period?\"      no    Protocols used: Sinus Pain and Congestion-LINA BOWENS RN 10/2/2024 at 7:27 AM    "

## 2024-10-02 NOTE — PATIENT INSTRUCTIONS
You can try Flonase nasal spray for the sinus and ear congestion.  You would take 2 sprays into each nostril once daily for a week and then go to one spray once daily for a week.  You can also take Mucinex for the congestion as well.    Make sure to drink plenty of water and stay well hydrated.

## 2024-10-29 ENCOUNTER — MYC MEDICAL ADVICE (OUTPATIENT)
Dept: PEDIATRICS | Facility: CLINIC | Age: 39
End: 2024-10-29
Payer: COMMERCIAL

## 2024-10-30 ENCOUNTER — OFFICE VISIT (OUTPATIENT)
Dept: CARDIOLOGY | Facility: CLINIC | Age: 39
End: 2024-10-30
Payer: COMMERCIAL

## 2024-10-30 VITALS
SYSTOLIC BLOOD PRESSURE: 122 MMHG | OXYGEN SATURATION: 98 % | HEART RATE: 74 BPM | WEIGHT: 162.6 LBS | HEIGHT: 69 IN | BODY MASS INDEX: 24.08 KG/M2 | DIASTOLIC BLOOD PRESSURE: 84 MMHG

## 2024-10-30 DIAGNOSIS — F41.9 ANXIETY: ICD-10-CM

## 2024-10-30 DIAGNOSIS — R10.13 ABDOMINAL PAIN, EPIGASTRIC: Primary | ICD-10-CM

## 2024-10-30 RX ORDER — PAROXETINE 10 MG/1
10 TABLET, FILM COATED ORAL EVERY MORNING
Qty: 90 TABLET | Refills: 3 | Status: SHIPPED | OUTPATIENT
Start: 2024-10-30

## 2024-10-30 RX ORDER — CARVEDILOL 6.25 MG/1
6.25 TABLET ORAL SEE ADMIN INSTRUCTIONS
Qty: 90 TABLET | Refills: 3 | Status: SHIPPED | OUTPATIENT
Start: 2024-10-30

## 2024-10-30 NOTE — PROGRESS NOTES
Cardiology Progress Note          Assessment and Plan:       Episodic abdominal pain, thought to be endometriosis variant with corresponding spikes of blood pressure  The blood pressure is not very elevated and there is no tachycardia associated, pheochromocytoma is probably not likely high on the differential  Agree with continued workup and treatment, possible repeat CT abdomen with contrast if no etiology found and escalating symptoms.      Episodic blood pressure spikes with pain  Normal physiologic response, patient has heightened adrenaline receptor response.  Try small dose of carvedilol during weeks of cyclical abdominal discomfort.  She may take it once per day in the morning or up to twice per day if it helps.      Anxiety, self-medicating with alcohol  Trial of low-dose Paxil and reducing alcohol intake.  May have this adjusted or titrated by her PMD or psychiatry.    Follow-up with me in 4 months, patient will contact me in the meantime if she has any negative side effects with the prescriptions given today.    40 minutes was spent with the patient, precharting and reviewing tests as well as post visit charting all done today..    This note was transcribed using electronic voice recognition software and there may be typographical errors present.     The longitudinal plan of care for the diagnosis(es)/condition(s) as documented were addressed during this visit. Due to the added complexity in care, I will continue to support Beth in the subsequent management and with ongoing continuity of care.                    Interval History:     The patient is a very pleasant 39 year old sister of Jie Price and whose father is Trung Bradley, patients of [employer].  I have seen her previously for family history of cardiomyopathy but no personal history of cardiomyopathy.    She has had what they think is a variant of endometriosis causing intermittent cyclical abdominal pain.  This spikes her blood pressures to the  "140-150 range.  She does not have any palpitations and her heart rate is 60 to 90 bpm at that time.    They gave her the option of birth control, IUD or hysterectomy.  She tried the birth control but it made her have atypical sharp chest pain so she stopped it after 1 day.    She drinks 3 to 4 glasses of wine most days in order to help with her anxiety.  She feels that her anxiety is an issue and would like help with that.                     Review of Systems:     Review of Systems:  Skin:        Eyes:       ENT:       Respiratory:       Cardiovascular:    Positive for  Gastroenterology: Positive for abdominal pain  Genitourinary:       Musculoskeletal:       Neurologic:       Psychiatric:       Heme/Lymph/Imm:       Endocrine:                   Physical Exam:     Vitals: /84 (BP Location: Right arm, Patient Position: Sitting, Cuff Size: Adult Regular)   Pulse 74   Ht 1.746 m (5' 8.75\")   Wt 73.8 kg (162 lb 9.6 oz)   LMP 07/22/2024 (Approximate)   SpO2 98%   BMI 24.19 kg/m    Constitutional:  cooperative, alert and oriented, well developed, well nourished, in no acute distress        Skin:  warm and dry to the touch, no apparent skin lesions or masses noted        Head:  normocephalic, no masses or lesions        Eyes:  pupils equal and round, conjunctivae and lids unremarkable, sclera white, no xanthalasma, EOMS intact, no nystagmus        Chest:  normal symmetry        Cardiac: regular rhythm;normal S1 and S2;no murmurs, gallops or rubs detected                  Extremities and Back:  no deformities, clubbing, cyanosis, erythema observed;no edema        Neurological:  no gross motor deficits;affect appropriate                 Medications:     Current Outpatient Medications   Medication Sig Dispense Refill    carvedilol (COREG) 6.25 MG tablet Take 1 tablet (6.25 mg) by mouth See Admin Instructions. May take when having pain and elevated blood pressure once per day (up to twice per day). 90 tablet 3    " "PARoxetine (PAXIL) 10 MG tablet Take 1 tablet (10 mg) by mouth every morning. 90 tablet 3                Data:   All laboratory data reviewed  No results found for this or any previous visit (from the past 24 hours).    All laboratory data reviewed  Lab Results   Component Value Date    CHOL 207 09/25/2023    CHOL 217 12/31/2020     Lab Results   Component Value Date     09/25/2023    HDL 78 12/31/2020     Lab Results   Component Value Date    LDL 86 09/25/2023     12/31/2020     Lab Results   Component Value Date    TRIG 52 09/25/2023    TRIG 60 12/31/2020     No results found for: \"CHOLHDLRATIO\"  TSH   Date Value Ref Range Status   03/22/2024 1.88 0.30 - 4.20 uIU/mL Final   12/31/2020 2.04 0.40 - 4.00 mU/L Final     Last Basic Metabolic Panel:  Lab Results   Component Value Date     08/07/2024     01/06/2017      Lab Results   Component Value Date    POTASSIUM 3.8 08/07/2024    POTASSIUM 3.7 01/06/2017     Lab Results   Component Value Date    CHLORIDE 103 08/07/2024    CHLORIDE 105 01/06/2017     Lab Results   Component Value Date    EDWAR 9.2 08/07/2024    EDWAR 8.9 01/06/2017     Lab Results   Component Value Date    CO2 24 08/07/2024    CO2 25 01/06/2017     Lab Results   Component Value Date    BUN 12.3 08/07/2024    BUN 14 01/06/2017     Lab Results   Component Value Date    CR 0.61 08/07/2024    CR 0.73 01/06/2017     Lab Results   Component Value Date    GLC 95 08/07/2024    GLC 82 01/06/2017     Lab Results   Component Value Date    WBC 4.5 08/07/2024    WBC 7.2 01/06/2017     Lab Results   Component Value Date    RBC 4.52 08/07/2024    RBC 4.75 01/06/2017     Lab Results   Component Value Date    HGB 14.3 08/07/2024    HGB 14.9 01/06/2017     Lab Results   Component Value Date    HCT 41.9 08/07/2024    HCT 43.1 01/06/2017     Lab Results   Component Value Date    MCV 93 08/07/2024    MCV 91 01/06/2017     Lab Results   Component Value Date    MCH 31.6 08/07/2024    MCH 31.4 " 01/06/2017     Lab Results   Component Value Date    MCHC 34.1 08/07/2024    MCHC 34.6 01/06/2017     Lab Results   Component Value Date    RDW 11.9 08/07/2024    RDW 11.9 01/06/2017     Lab Results   Component Value Date     08/07/2024     01/06/2017

## 2024-10-30 NOTE — LETTER
10/30/2024    Maira Matias MD  [address] [place] [address]    RE: Beth KAYLA Kirk       Dear Colleague,     I had the pleasure of seeing Bethlynne Bradley in the [facility].  Cardiology Progress Note          Assessment and Plan:       Episodic abdominal pain, thought to be endometriosis variant with corresponding spikes of blood pressure  The blood pressure is not very elevated and there is no tachycardia associated, pheochromocytoma is probably not likely high on the differential  Agree with continued workup and treatment, possible repeat CT abdomen with contrast if no etiology found and escalating symptoms.      Episodic blood pressure spikes with pain  Normal physiologic response, patient has heightened adrenaline receptor response.  Try small dose of carvedilol during weeks of cyclical abdominal discomfort.  She may take it once per day in the morning or up to twice per day if it helps.      Anxiety, self-medicating with alcohol  Trial of low-dose Paxil and reducing alcohol intake.  May have this adjusted or titrated by her PMD or psychiatry.    Follow-up with me in 4 months, patient will contact me in the meantime if she has any negative side effects with the prescriptions given today.    40 minutes was spent with the patient, precharting and reviewing tests as well as post visit charting all done today..    This note was transcribed using electronic voice recognition software and there may be typographical errors present.     The longitudinal plan of care for the diagnosis(es)/condition(s) as documented were addressed during this visit. Due to the added complexity in care, I will continue to support Beth in the subsequent management and with ongoing continuity of care.                    Interval History:     The patient is a very pleasant 39 year old sister of Jie Price and whose father is Trung Bradley, patients of [employer].  I have seen her previously for  "family history of cardiomyopathy but no personal history of cardiomyopathy.    She has had what they think is a variant of endometriosis causing intermittent cyclical abdominal pain.  This spikes her blood pressures to the 140-150 range.  She does not have any palpitations and her heart rate is 60 to 90 bpm at that time.    They gave her the option of birth control, IUD or hysterectomy.  She tried the birth control but it made her have atypical sharp chest pain so she stopped it after 1 day.    She drinks 3 to 4 glasses of wine most days in order to help with her anxiety.  She feels that her anxiety is an issue and would like help with that.                     Review of Systems:     Review of Systems:  Skin:        Eyes:       ENT:       Respiratory:       Cardiovascular:    Positive for  Gastroenterology: Positive for abdominal pain  Genitourinary:       Musculoskeletal:       Neurologic:       Psychiatric:       Heme/Lymph/Imm:       Endocrine:                   Physical Exam:     Vitals: /84 (BP Location: Right arm, Patient Position: Sitting, Cuff Size: Adult Regular)   Pulse 74   Ht 1.746 m (5' 8.75\")   Wt 73.8 kg (162 lb 9.6 oz)   LMP 07/22/2024 (Approximate)   SpO2 98%   BMI 24.19 kg/m    Constitutional:  cooperative, alert and oriented, well developed, well nourished, in no acute distress        Skin:  warm and dry to the touch, no apparent skin lesions or masses noted        Head:  normocephalic, no masses or lesions        Eyes:  pupils equal and round, conjunctivae and lids unremarkable, sclera white, no xanthalasma, EOMS intact, no nystagmus        Chest:  normal symmetry        Cardiac: regular rhythm;normal S1 and S2;no murmurs, gallops or rubs detected                  Extremities and Back:  no deformities, clubbing, cyanosis, erythema observed;no edema        Neurological:  no gross motor deficits;affect appropriate                 Medications:     Current Outpatient Medications " "  Medication Sig Dispense Refill     carvedilol (COREG) 6.25 MG tablet Take 1 tablet (6.25 mg) by mouth See Admin Instructions. May take when having pain and elevated blood pressure once per day (up to twice per day). 90 tablet 3     PARoxetine (PAXIL) 10 MG tablet Take 1 tablet (10 mg) by mouth every morning. 90 tablet 3                Data:   All laboratory data reviewed  No results found for this or any previous visit (from the past 24 hours).    All laboratory data reviewed  Lab Results   Component Value Date    CHOL 207 09/25/2023    CHOL 217 12/31/2020     Lab Results   Component Value Date     09/25/2023    HDL 78 12/31/2020     Lab Results   Component Value Date    LDL 86 09/25/2023     12/31/2020     Lab Results   Component Value Date    TRIG 52 09/25/2023    TRIG 60 12/31/2020     No results found for: \"CHOLHDLRATIO\"  TSH   Date Value Ref Range Status   03/22/2024 1.88 0.30 - 4.20 uIU/mL Final   12/31/2020 2.04 0.40 - 4.00 mU/L Final     Last Basic Metabolic Panel:  Lab Results   Component Value Date     08/07/2024     01/06/2017      Lab Results   Component Value Date    POTASSIUM 3.8 08/07/2024    POTASSIUM 3.7 01/06/2017     Lab Results   Component Value Date    CHLORIDE 103 08/07/2024    CHLORIDE 105 01/06/2017     Lab Results   Component Value Date    EDWAR 9.2 08/07/2024    EDWAR 8.9 01/06/2017     Lab Results   Component Value Date    CO2 24 08/07/2024    CO2 25 01/06/2017     Lab Results   Component Value Date    BUN 12.3 08/07/2024    BUN 14 01/06/2017     Lab Results   Component Value Date    CR 0.61 08/07/2024    CR 0.73 01/06/2017     Lab Results   Component Value Date    GLC 95 08/07/2024    GLC 82 01/06/2017     Lab Results   Component Value Date    WBC 4.5 08/07/2024    WBC 7.2 01/06/2017     Lab Results   Component Value Date    RBC 4.52 08/07/2024    RBC 4.75 01/06/2017     Lab Results   Component Value Date    HGB 14.3 08/07/2024    HGB 14.9 01/06/2017     Lab " Results   Component Value Date    HCT 41.9 08/07/2024    HCT 43.1 01/06/2017     Lab Results   Component Value Date    MCV 93 08/07/2024    MCV 91 01/06/2017     Lab Results   Component Value Date    MCH 31.6 08/07/2024    MCH 31.4 01/06/2017     Lab Results   Component Value Date    MCHC 34.1 08/07/2024    MCHC 34.6 01/06/2017     Lab Results   Component Value Date    RDW 11.9 08/07/2024    RDW 11.9 01/06/2017     Lab Results   Component Value Date     08/07/2024     01/06/2017               Thank you for allowing me to participate in the care of your patient.      Sincerely,     Richard Bobby MD     [facility]  cc:   Richard Bobby MD  [address]  [place] [address]

## 2024-12-13 ENCOUNTER — TRANSFERRED RECORDS (OUTPATIENT)
Dept: HEALTH INFORMATION MANAGEMENT | Facility: CLINIC | Age: 39
End: 2024-12-13
Payer: COMMERCIAL

## 2024-12-20 ENCOUNTER — ANCILLARY PROCEDURE (OUTPATIENT)
Dept: MAMMOGRAPHY | Facility: CLINIC | Age: 39
End: 2024-12-20
Attending: OBSTETRICS & GYNECOLOGY
Payer: COMMERCIAL

## 2024-12-20 DIAGNOSIS — Z12.31 VISIT FOR SCREENING MAMMOGRAM: ICD-10-CM

## 2024-12-20 PROCEDURE — 77067 SCR MAMMO BI INCL CAD: CPT | Mod: TC | Performed by: RADIOLOGY

## 2024-12-20 PROCEDURE — 77063 BREAST TOMOSYNTHESIS BI: CPT | Mod: TC | Performed by: RADIOLOGY

## 2024-12-21 ENCOUNTER — HEALTH MAINTENANCE LETTER (OUTPATIENT)
Age: 39
End: 2024-12-21

## 2025-01-29 ENCOUNTER — VIRTUAL VISIT (OUTPATIENT)
Dept: PEDIATRICS | Facility: CLINIC | Age: 40
End: 2025-01-29
Payer: COMMERCIAL

## 2025-01-29 DIAGNOSIS — R10.31 ABDOMINAL PAIN, RIGHT LOWER QUADRANT: Primary | ICD-10-CM

## 2025-01-29 DIAGNOSIS — R10.84 ABDOMINAL PAIN, GENERALIZED: ICD-10-CM

## 2025-01-29 PROCEDURE — 98005 SYNCH AUDIO-VIDEO EST LOW 20: CPT | Performed by: PHYSICIAN ASSISTANT

## 2025-01-29 ASSESSMENT — PATIENT HEALTH QUESTIONNAIRE - PHQ9
SUM OF ALL RESPONSES TO PHQ QUESTIONS 1-9: 15
SUM OF ALL RESPONSES TO PHQ QUESTIONS 1-9: 15
10. IF YOU CHECKED OFF ANY PROBLEMS, HOW DIFFICULT HAVE THESE PROBLEMS MADE IT FOR YOU TO DO YOUR WORK, TAKE CARE OF THINGS AT HOME, OR GET ALONG WITH OTHER PEOPLE: VERY DIFFICULT

## 2025-01-29 NOTE — PROGRESS NOTES
Beth is a 39 year old who is being evaluated via a billable video visit.    How would you like to obtain your AVS? MyChart  If the video visit is dropped, the invitation should be resent by: Send to e-mail at: [email or web address]  Will anyone else be joining your video visit? No  {If patient encounters technical issues they should call [phone] :[id number]}    {PROVIDER CHARTING PREFERENCE:857892}    Subjective   Beth is a 39 year old, presenting for the following health issues:  chronic pain      1/29/2025     7:58 AM   Additional Questions   Roomed by BB VF   Accompanied by none         1/29/2025     7:58 AM   Patient Reported Additional Medications   Patient reports taking the following new medications none     History of Present Illness       Back Pain:  She presents for follow up of back pain. Patient's back pain is a chronic problem.  Location of back pain:  Right lower back, right middle of back and right hip  Description of back pain: cramping, dull ache and gnawing  Back pain spreads: right thigh and left thigh    Since patient first noticed back pain, pain is: unchanged  Does back pain interfere with her job:  Yes       Hypertension: She presents for follow up of hypertension.  She does check blood pressure  regularly outside of the clinic. Outside blood pressures have been over 140/90. She does not follow a low salt diet.     Reason for visit:  Chronic and intermittent lower abdominal pains, lower back pains, and leg pains that affect quality of life and spike my blood pressure.    She eats 0-1 servings of fruits and vegetables daily.She consumes 0 sweetened beverage(s) daily.She exercises with enough effort to increase her heart rate 10 to 19 minutes per day.  She exercises with enough effort to increase her heart rate 3 or less days per week. She is missing 7 dose(s) of medications per week.  She is not taking prescribed medications regularly due to other.       Patient was prescribed Paxil  "and she has Carvedilol because her BP increased with the chronic pain.      Patient has chronic in her lower right abdominal area.      Consider adenomyosis per OB/GYN.  Trying hormone therapy before removing uterus.  Has a prescription for progesterone only.  Has the prescription, but has not started it.      She has been drinking alcohol daily anywhere from 2.2 - 4.5 glasses of wine a day.  She says that she is trying to scale back a little bit.      She has had on and off stool changes.  Has always had large stool, and mixed with some constipation.     {ROS Picklists (Optional):330690}      Objective    Vitals - Patient Reported  Pain Score: Severe Pain (7)  Pain Loc: Abdomen (lower right)        Physical Exam   {video visit exam brief selected:886848}    {Diagnostic Test Results (Optional):399034}      Video-Visit Details    Type of service:  Video Visit   Originating Location (pt. Location): {video visit patient location:269697::\"Home\"}  {PROVIDER LOCATION On-site should be selected for visits conducted from your clinic location or adjoining [facility] hospital, academic office, or other nearby [facility] building. Off-site should be selected for all other provider locations, including home:223133}  Distant Location (provider location):  {virtual location provider:208579}  Platform used for Video Visit: {Virtual Visit Platforms:566078::\"Digital Bloom\"}  Signed Electronically by: Deepthi Mariano PA-C  {Email feedback regarding this note to [email or web address]   :977597}  " clear. No significant rash, abnormal pigmentation or lesions.  NEURO: Cranial nerves grossly intact.  Mentation and speech appropriate for age.  PSYCH: Appropriate affect, tone, and pace of words        Video-Visit Details    Type of service:  Video Visit   Originating Location (pt. Location): Home    Distant Location (provider location):  On-site  Platform used for Video Visit: Eduardo  Signed Electronically by: Deepthi Mariano PA-C

## 2025-03-24 ENCOUNTER — PATIENT OUTREACH (OUTPATIENT)
Dept: CARE COORDINATION | Facility: CLINIC | Age: 40
End: 2025-03-24
Payer: COMMERCIAL

## 2025-04-23 ENCOUNTER — MYC MEDICAL ADVICE (OUTPATIENT)
Dept: PEDIATRICS | Facility: CLINIC | Age: 40
End: 2025-04-23
Payer: COMMERCIAL

## 2025-04-24 NOTE — TELEPHONE ENCOUNTER
See my chart, Evisit recommended for requested referral     Agnieszka Majano, Registered Nurse  [facility]

## 2025-04-28 ENCOUNTER — E-VISIT (OUTPATIENT)
Dept: PEDIATRICS | Facility: CLINIC | Age: 40
End: 2025-04-28
Payer: COMMERCIAL

## 2025-04-28 DIAGNOSIS — R10.84 ABDOMINAL PAIN, GENERALIZED: Primary | ICD-10-CM

## 2025-04-28 NOTE — PATIENT INSTRUCTIONS
Thank you for choosing us for your care. I have placed the below referral(s) for you:  Orders Placed This Encounter   Procedures     Ob/Gyn  Referral       Please click the link for your After Visit Summary to view scheduling instructions for your referral. In most cases, you will be contacted within 2 business days to schedule. If you do not hear from a representative within that time, please call [phone] to be connected to a .

## 2025-04-29 ENCOUNTER — PATIENT OUTREACH (OUTPATIENT)
Dept: CARE COORDINATION | Facility: CLINIC | Age: 40
End: 2025-04-29
Payer: COMMERCIAL

## 2025-05-27 ENCOUNTER — MYC MEDICAL ADVICE (OUTPATIENT)
Dept: PEDIATRICS | Facility: CLINIC | Age: 40
End: 2025-05-27
Payer: COMMERCIAL